# Patient Record
Sex: FEMALE | Race: WHITE | Employment: UNEMPLOYED | URBAN - METROPOLITAN AREA
[De-identification: names, ages, dates, MRNs, and addresses within clinical notes are randomized per-mention and may not be internally consistent; named-entity substitution may affect disease eponyms.]

---

## 2017-07-02 ENCOUNTER — APPOINTMENT (OUTPATIENT)
Dept: GENERAL RADIOLOGY | Age: 27
End: 2017-07-02
Attending: EMERGENCY MEDICINE
Payer: MEDICAID

## 2017-07-02 ENCOUNTER — HOSPITAL ENCOUNTER (EMERGENCY)
Age: 27
Discharge: HOME | End: 2017-07-02
Attending: EMERGENCY MEDICINE
Payer: MEDICAID

## 2017-07-02 VITALS
TEMPERATURE: 99.1 F | BODY MASS INDEX: 24.69 KG/M2 | RESPIRATION RATE: 12 BRPM | HEART RATE: 64 BPM | OXYGEN SATURATION: 100 % | SYSTOLIC BLOOD PRESSURE: 107 MMHG | DIASTOLIC BLOOD PRESSURE: 65 MMHG | WEIGHT: 135 LBS

## 2017-07-02 DIAGNOSIS — S40.012A CONTUSION SHOULDER/ARM, LEFT, INITIAL ENCOUNTER: Primary | ICD-10-CM

## 2017-07-02 DIAGNOSIS — S30.0XXA PELVIC CONTUSION, INITIAL ENCOUNTER: ICD-10-CM

## 2017-07-02 DIAGNOSIS — S40.022A CONTUSION SHOULDER/ARM, LEFT, INITIAL ENCOUNTER: Primary | ICD-10-CM

## 2017-07-02 LAB — HCG UR QL: NEGATIVE

## 2017-07-02 PROCEDURE — 73060 X-RAY EXAM OF HUMERUS: CPT

## 2017-07-02 PROCEDURE — 81025 URINE PREGNANCY TEST: CPT | Performed by: EMERGENCY MEDICINE

## 2017-07-02 PROCEDURE — 99282 EMERGENCY DEPT VISIT SF MDM: CPT

## 2017-07-02 PROCEDURE — 72070 X-RAY EXAM THORAC SPINE 2VWS: CPT

## 2017-07-02 PROCEDURE — 73501 X-RAY EXAM HIP UNI 1 VIEW: CPT

## 2017-07-02 PROCEDURE — 74011250637 HC RX REV CODE- 250/637: Performed by: EMERGENCY MEDICINE

## 2017-07-02 RX ORDER — HYDROCODONE BITARTRATE AND ACETAMINOPHEN 5; 325 MG/1; MG/1
TABLET ORAL
Qty: 15 TAB | Refills: 0 | Status: SHIPPED | OUTPATIENT
Start: 2017-07-02 | End: 2017-08-18

## 2017-07-02 RX ORDER — HYDROCODONE BITARTRATE AND ACETAMINOPHEN 5; 325 MG/1; MG/1
1 TABLET ORAL
Status: COMPLETED | OUTPATIENT
Start: 2017-07-02 | End: 2017-07-02

## 2017-07-02 RX ADMIN — HYDROCODONE BITARTRATE AND ACETAMINOPHEN 1 TABLET: 5; 325 TABLET ORAL at 02:58

## 2017-07-02 NOTE — DISCHARGE INSTRUCTIONS
Low Back Contusion: Care Instructions  Your Care Instructions  Contusion is the medical term for a bruise. When you have a low back bruise, it's often caused by a direct blow or an impact, such as falling against a counter or table. Bruises are common sports injuries. Most people think of a bruise as a black-and-blue spot. This happens when small blood vessels get torn and leak blood under the skin. But bones, muscles, and organs can also get bruised. If these deep tissues are damaged, you may not always see a bruise. The doctor will examine your bruise. You may also have tests to make sure you do not have a more serious injury, such as a broken bone or nerve damage. Tests may include X-rays or other imaging tests like a CT scan or MRI. Low back bruises may cause pain and swelling. But if there is no serious damage, they will often get better with home treatment in several days to a few weeks. The doctor has checked you carefully, but problems can develop later. If you notice any problems or new symptoms, get medical treatment right away. Follow-up care is a key part of your treatment and safety. Be sure to make and go to all appointments, and call your doctor if you are having problems. It's also a good idea to know your test results and keep a list of the medicines you take. How can you care for yourself at home? · Put ice or a cold pack on the sore area for 10 to 20 minutes at a time to stop swelling. Put a thin cloth between the ice pack and your skin. · Be safe with medicines. Read and follow all instructions on the label. ¨ If the doctor gave you a prescription medicine for pain, take it as prescribed. ¨ If you are not taking a prescription pain medicine, ask your doctor if you can take an over-the-counter medicine. · For the first day or two of pain, take it easy. But as soon as possible, get back to your normal daily life and activities. · Get gentle exercise, such as walking.  Movement keeps your spine flexible and helps your muscles stay strong. When should you call for help? Call 911 anytime you think you may need emergency care. For example, call if:  · You are unable to move a leg at all. Call your doctor now or seek immediate medical care if:  · You have new or worse symptoms in your legs or buttocks. Symptoms may include:  ¨ Numbness or tingling. ¨ Weakness. ¨ Pain. · You lose bladder or bowel control. · You have blood in your urine. Watch closely for changes in your health, and be sure to contact your doctor if:  · You do not get better as expected. Where can you learn more? Go to http://aziza-kasie.info/. Enter V337 in the search box to learn more about \"Low Back Contusion: Care Instructions. \"  Current as of: March 20, 2017  Content Version: 11.3  © 5277-2843 [employer]. Care instructions adapted under license by [employer] (which disclaims liability or warranty for this information). If you have questions about a medical condition or this instruction, always ask your healthcare professional. [address] any warranty or liability for your use of this information. Low Back Contusion: Care Instructions  Your Care Instructions  Contusion is the medical term for a bruise. When you have a low back bruise, it's often caused by a direct blow or an impact, such as falling against a counter or table. Bruises are common sports injuries. Most people think of a bruise as a black-and-blue spot. This happens when small blood vessels get torn and leak blood under the skin. But bones, muscles, and organs can also get bruised. If these deep tissues are damaged, you may not always see a bruise. The doctor will examine your bruise. You may also have tests to make sure you do not have a more serious injury, such as a broken bone or nerve damage. Tests may include X-rays or other imaging tests like a CT scan or MRI.   Low back bruises may cause pain and swelling. But if there is no serious damage, they will often get better with home treatment in several days to a few weeks. The doctor has checked you carefully, but problems can develop later. If you notice any problems or new symptoms, get medical treatment right away. Follow-up care is a key part of your treatment and safety. Be sure to make and go to all appointments, and call your doctor if you are having problems. It's also a good idea to know your test results and keep a list of the medicines you take. How can you care for yourself at home? · Put ice or a cold pack on the sore area for 10 to 20 minutes at a time to stop swelling. Put a thin cloth between the ice pack and your skin. · Be safe with medicines. Read and follow all instructions on the label. ¨ If the doctor gave you a prescription medicine for pain, take it as prescribed. ¨ If you are not taking a prescription pain medicine, ask your doctor if you can take an over-the-counter medicine. · For the first day or two of pain, take it easy. But as soon as possible, get back to your normal daily life and activities. · Get gentle exercise, such as walking. Movement keeps your spine flexible and helps your muscles stay strong. When should you call for help? Call 911 anytime you think you may need emergency care. For example, call if:  · You are unable to move a leg at all. Call your doctor now or seek immediate medical care if:  · You have new or worse symptoms in your legs or buttocks. Symptoms may include:  ¨ Numbness or tingling. ¨ Weakness. ¨ Pain. · You lose bladder or bowel control. · You have blood in your urine. Watch closely for changes in your health, and be sure to contact your doctor if:  · You do not get better as expected. Where can you learn more? Go to http://aziza-kasie.info/.   Enter V337 in the search box to learn more about \"Low Back Contusion: Care Instructions. \"  Current as of: March 20, 2017  Content Version: 11.3  © 9397-0352 [employer]. Care instructions adapted under license by [employer] (which disclaims liability or warranty for this information). If you have questions about a medical condition or this instruction, always ask your healthcare professional. [address] any warranty or liability for your use of this information. Low Back Contusion: Care Instructions  Your Care Instructions  Contusion is the medical term for a bruise. When you have a low back bruise, it's often caused by a direct blow or an impact, such as falling against a counter or table. Bruises are common sports injuries. Most people think of a bruise as a black-and-blue spot. This happens when small blood vessels get torn and leak blood under the skin. But bones, muscles, and organs can also get bruised. If these deep tissues are damaged, you may not always see a bruise. The doctor will examine your bruise. You may also have tests to make sure you do not have a more serious injury, such as a broken bone or nerve damage. Tests may include X-rays or other imaging tests like a CT scan or MRI. Low back bruises may cause pain and swelling. But if there is no serious damage, they will often get better with home treatment in several days to a few weeks. The doctor has checked you carefully, but problems can develop later. If you notice any problems or new symptoms, get medical treatment right away. Follow-up care is a key part of your treatment and safety. Be sure to make and go to all appointments, and call your doctor if you are having problems. It's also a good idea to know your test results and keep a list of the medicines you take. How can you care for yourself at home? · Put ice or a cold pack on the sore area for 10 to 20 minutes at a time to stop swelling. Put a thin cloth between the ice pack and your skin.   · Be safe with medicines. Read and follow all instructions on the label. ¨ If the doctor gave you a prescription medicine for pain, take it as prescribed. ¨ If you are not taking a prescription pain medicine, ask your doctor if you can take an over-the-counter medicine. · For the first day or two of pain, take it easy. But as soon as possible, get back to your normal daily life and activities. · Get gentle exercise, such as walking. Movement keeps your spine flexible and helps your muscles stay strong. When should you call for help? Call 911 anytime you think you may need emergency care. For example, call if:  · You are unable to move a leg at all. Call your doctor now or seek immediate medical care if:  · You have new or worse symptoms in your legs or buttocks. Symptoms may include:  ¨ Numbness or tingling. ¨ Weakness. ¨ Pain. · You lose bladder or bowel control. · You have blood in your urine. Watch closely for changes in your health, and be sure to contact your doctor if:  · You do not get better as expected. Where can you learn more? Go to http://aziza-kasie.info/. Enter V337 in the search box to learn more about \"Low Back Contusion: Care Instructions. \"  Current as of: March 20, 2017  Content Version: 11.3  © 6801-2523 [employer], [email or web address]. Care instructions adapted under license by [employer] (which disclaims liability or warranty for this information). If you have questions about a medical condition or this instruction, always ask your healthcare professional. [place] [address] any warranty or liability for your use of this information.

## 2017-07-02 NOTE — ED NOTES
Pt instructed to stay active as tolerated, to follow up with her PCP, to use caution with narcotics, and to return for worsening pain/other concerns.

## 2017-07-02 NOTE — ED PROVIDER NOTES
HPI Comments: 1:08 AM Charissa Caicedo is a 33 y.o. female with hx of scoliosis and hypermobile joints who presents to the ED c/o bilateral hip pain onset today s/p fall. She also c/o L shoulder pain. She states she was carrying a vacuum  while on steep, uneven stairs, she tripped and fell, and the vacuum  pushed into her L shoulder. Pt denies any chance of pregnancy. Pt denies hitting head, LOC, syncope, or any other sx at this time. The history is provided by the patient. No  was used. Past Medical History:   Diagnosis Date    Hypermobile joints     arms, hip, upper back    Scoliosis        Past Surgical History:   Procedure Laterality Date    HX OTHER SURGICAL               Family History:   Problem Relation Age of Onset    Family history unknown: Yes       Social History     Social History    Marital status:      Spouse name: N/A    Number of children: N/A    Years of education: N/A     Occupational History    Not on file. Social History Main Topics    Smoking status: Never Smoker    Smokeless tobacco: Never Used    Alcohol use No    Drug use: No    Sexual activity: Yes     Partners: Male     Birth control/ protection: None     Other Topics Concern    Not on file     Social History Narrative         ALLERGIES: Petrolatum [mineral oil-hydrophil petrolat]; Codeine; and Tramadol    Review of Systems   Constitutional: Negative for chills, fatigue and fever. HENT: Negative for congestion, rhinorrhea and sore throat. Eyes: Negative for visual disturbance. Respiratory: Negative for cough and shortness of breath. Cardiovascular: Negative for chest pain and palpitations. Gastrointestinal: Negative for abdominal pain, diarrhea, nausea and vomiting. Genitourinary: Negative for dysuria, hematuria and urgency. Musculoskeletal: Positive for arthralgias. Negative for myalgias. Skin: Negative for rash and wound.    Neurological: Negative for dizziness and headaches. Psychiatric/Behavioral: The patient is not nervous/anxious. All other systems reviewed and are negative. Vitals:    07/02/17 0053   BP: 117/74   Pulse: 82   Resp: 16   Temp: 99.1 °F (37.3 °C)   SpO2: 100%   Weight: 61.2 kg (135 lb)            Physical Exam   Constitutional: She is oriented to person, place, and time. She appears well-developed and well-nourished. No distress. HENT:   Head: Normocephalic. Right Ear: External ear normal.   Left Ear: External ear normal.   Mouth/Throat: No oropharyngeal exudate. Eyes: Conjunctivae and EOM are normal. Pupils are equal, round, and reactive to light. Right eye exhibits no discharge. Left eye exhibits no discharge. No scleral icterus. Neck: Normal range of motion. Neck supple. No JVD present. No tracheal deviation present. No thyromegaly present. Cardiovascular: Normal rate, regular rhythm, normal heart sounds and intact distal pulses. Exam reveals no gallop and no friction rub. No murmur heard. Pulmonary/Chest: Effort normal and breath sounds normal. No stridor. No respiratory distress. She has no wheezes. She has no rales. She exhibits no tenderness. Abdominal: Soft. Bowel sounds are normal. She exhibits no distension and no mass. There is no tenderness. There is no rebound and no guarding. Musculoskeletal: Normal range of motion. She exhibits no edema or tenderness. LEFT SHOULDER: (+) tenderness over anterior surface, normal ROM, pulses and sensory. No edema or abrasions. Lymphadenopathy:     She has no cervical adenopathy. Neurological: She is alert and oriented to person, place, and time. She displays normal reflexes. No cranial nerve deficit. She exhibits normal muscle tone. Coordination normal.   Skin: Skin is warm and dry. No rash noted. She is not diaphoretic. No erythema. No pallor. Nursing note and vitals reviewed.        MDM  Number of Diagnoses or Management Options  Contusion shoulder/arm, left, initial encounter: new and requires workup  Pelvic contusion, initial encounter: new and requires workup     Amount and/or Complexity of Data Reviewed  Tests in the radiology section of CPT®: ordered and reviewed    Risk of Complications, Morbidity, and/or Mortality  Presenting problems: low  Diagnostic procedures: moderate  Management options: low    Patient Progress  Patient progress: stable    ED Course       Procedures    Vitals:  Patient Vitals for the past 12 hrs:   Temp Pulse Resp BP SpO2   07/02/17 0053 99.1 °F (37.3 °C) 82 16 117/74 100 %     100% on RA, indicating adequate oxygenation. Medications ordered:   Medications - No data to display      X-Ray, CT or other radiology findings or impressions:  XR HIP RT W OR WO PELV  1 VW    (Results Pending)   XR HUMERUS LT    (Results Pending)   XR SPINE THORAC 2 V    (Results Pending)   XR SHOULDER LT AP/LAT MIN 2 V    (Results Pending)   XR PELV 1 OR 2 V    (Results Pending)       Progress notes, Consult notes or additional Procedure notes:       Reevaluation of patient: patient feeling better. Disposition:  Diagnosis:     Disposition:     Follow-up Information     None            Patient's Medications   Start Taking    No medications on file   Continue Taking    NORETHINDRONE-ETHINYL ESTRADIOL (ORTHO-NOVUM 1-35 TAB, NORTREL 1-35 TAB) 1-35 MG-MCG TAB    Take 1 Tab by mouth daily. These Medications have changed    No medications on file   Stop Taking    GABAPENTIN (NEURONTIN) 100 MG CAPSULE    TAKE ONE CAPSULE BY MOUTH THREE TIMES A DAY    MELOXICAM (MOBIC) 7.5 MG TABLET    TAKE 1 TABLET BY MOUTH EVERY DAY    METHOCARBAMOL (ROBAXIN) 500 MG TABLET    Take 0.5-1 Tabs by mouth two (2) times daily as needed. 1 tab PO QHs PRN pain spasms   Indications: MUSCLE SPASM    NAPROXEN (NAPROSYN) 500 MG TABLET    Take 1 Tab by mouth two (2) times daily (with meals).     TRAMADOL (ULTRAM) 50 MG TABLET    Take 1 Tab by mouth every six (6) hours as needed for Pain. Max Daily Amount: 200 mg.          Scribe Attestation:   Baldo Castillo acting as a scribe for and in the presence of Eh Monge MD July 02, 2017 at 12:59 AM     Signed by: Jewell Delacruz, July 02, 2017, 12:59 AM    Provider Attestation:   I personally performed the services described in the documentation, reviewed the documentation, as recorded by the scribe in my presence, and it accurately and completely records my words and actions.      Reviewed and signed by:  Eh Monge MD

## 2017-08-18 ENCOUNTER — HOSPITAL ENCOUNTER (EMERGENCY)
Age: 27
Discharge: HOME | End: 2017-08-18
Attending: EMERGENCY MEDICINE
Payer: MEDICAID

## 2017-08-18 VITALS
OXYGEN SATURATION: 100 % | HEIGHT: 62 IN | WEIGHT: 127 LBS | SYSTOLIC BLOOD PRESSURE: 113 MMHG | TEMPERATURE: 98.7 F | RESPIRATION RATE: 18 BRPM | BODY MASS INDEX: 23.37 KG/M2 | HEART RATE: 83 BPM | DIASTOLIC BLOOD PRESSURE: 76 MMHG
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
## 2017-08-18 PROCEDURE — 74011250637 HC RX REV CODE- 250/637: Performed by: PHYSICIAN ASSISTANT

## 2017-08-18 PROCEDURE — 99283 EMERGENCY DEPT VISIT LOW MDM: CPT

## 2017-08-18 RX ORDER — CEPHALEXIN 500 MG/1
500 CAPSULE ORAL 4 TIMES DAILY
Qty: 28 CAP | Refills: 0 | Status: SHIPPED | OUTPATIENT
Start: 2017-08-18 | End: 2017-08-25

## 2017-08-18 RX ORDER — CEPHALEXIN 250 MG/1
500 CAPSULE ORAL
Status: COMPLETED | OUTPATIENT
Start: 2017-08-18 | End: 2017-08-18

## 2017-08-18 RX ADMIN — CEPHALEXIN 500 MG: 250 CAPSULE ORAL at 20:52

## 2017-08-19 NOTE — ED NOTES
I have reviewed discharge instructions with the patient. The patient verbalized understanding. Patient armband removed and given to patient to take home. Patient was informed of the privacy risks if armband lost or stolen  Current Discharge Medication List      START taking these medications    Details   cephALEXin (KEFLEX) 500 mg capsule Take 1 Cap by mouth four (4) times daily for 7 days.   Qty: 28 Cap, Refills: 0

## 2017-08-19 NOTE — ED PROVIDER NOTES
HPI Comments: 8:34 PM Austin Barreto is a 33 y.o. female who presents to the emergency department with c/o red bump \"spider bite\" onset 2 days ago. No fevers. States that the area is tender and itchy. States redness has improved. No discharge. Rates pain 2/10, sore, constant, non-radiating, worse when touched, no alleviating factors. PCP: Chiquita Garcia MD      The history is provided by the patient. No  was used. Past Medical History:   Diagnosis Date    Hypermobile joints     arms, hip, upper back    Scoliosis        Past Surgical History:   Procedure Laterality Date    HX  SECTION      HX OTHER SURGICAL               Family History:   Problem Relation Age of Onset    Family history unknown: Yes       Social History     Social History    Marital status:      Spouse name: N/A    Number of children: N/A    Years of education: N/A     Occupational History    Not on file. Social History Main Topics    Smoking status: Current Every Day Smoker    Smokeless tobacco: Never Used    Alcohol use No    Drug use: No    Sexual activity: Yes     Partners: Male     Birth control/ protection: None     Other Topics Concern    Not on file     Social History Narrative         ALLERGIES: Petrolatum [mineral oil-hydrophil petrolat]; Codeine; and Tramadol    Review of Systems   Constitutional: Negative for chills and fever. Respiratory: Negative for shortness of breath. Cardiovascular: Negative for chest pain. Gastrointestinal: Negative for abdominal pain, nausea and vomiting. Musculoskeletal: Negative for arthralgias. Skin: Positive for color change. Hematological: Does not bruise/bleed easily.        Vitals:    17 2037 17 2041   BP: 113/76    Pulse: 83    Resp: 18    Temp: 98.7 °F (37.1 °C)    SpO2: 100% 100%   Weight: 57.6 kg (127 lb)    Height: 5' 2\" (1.575 m)             Physical Exam   Constitutional: She is oriented to person, place, and time. She appears well-developed and well-nourished. No distress. HENT:   Head: Normocephalic and atraumatic. Pulmonary/Chest: Effort normal. No respiratory distress. Musculoskeletal: Normal range of motion. Legs:  Neurological: She is alert and oriented to person, place, and time. Skin: Skin is warm and dry. Nursing note and vitals reviewed. MDM  Number of Diagnoses or Management Options  Infected insect bite, initial encounter:   Diagnosis management comments: Differential Diagnosis:  Abscess, cellulitis, erysipelas, folliculitis,allergic reaction, insect bite    No palpable abscess, likely infected insect bite. Will treat with keflex. Discussed reasons to return to the ED. ED Course       Procedures      Vitals:  Patient Vitals for the past 12 hrs:   Temp Pulse Resp BP SpO2   08/18/17 2041 - - - - 100 %   08/18/17 2037 98.7 °F (37.1 °C) 83 18 113/76 100 %       Medications ordered:   Medications   cephALEXin (KEFLEX) capsule 500 mg (not administered)         Lab findings:  No results found for this or any previous visit (from the past 12 hour(s)). Disposition:  Diagnosis:   1. Infected insect bite, initial encounter        Disposition: d/c home    Follow-up Information     Follow up With Details Comments Contact Info    Delores Nowak MD  As needed [address] [place], [place] [address]e S  [phone]      [facility] EMERGENCY DEPT  As needed, If symptoms worsen [address] [facility]  [phone]            Patient's Medications   Start Taking    CEPHALEXIN (KEFLEX) 500 MG CAPSULE    Take 1 Cap by mouth four (4) times daily for 7 days. Continue Taking    NORETHINDRONE-ETHINYL ESTRADIOL (ORTHO-NOVUM 1-35 TAB, NORTREL 1-35 TAB) 1-35 MG-MCG TAB    Take 1 Tab by mouth daily.    These Medications have changed    No medications on file   Stop Taking    HYDROCODONE-ACETAMINOPHEN (NORCO) 5-325 MG PER TABLET    Take 1-2 tablets PO every 4-6 hours as needed for pain control. If over the counter ibuprofen or acetaminophen was suggested, then only take the vicodin for pain not well controlled with the over the counter medication.

## 2018-04-07 ENCOUNTER — HOSPITAL ENCOUNTER (EMERGENCY)
Age: 28
Discharge: HOME | End: 2018-04-08
Attending: EMERGENCY MEDICINE
Payer: MEDICAID

## 2018-04-07 ENCOUNTER — APPOINTMENT (OUTPATIENT)
Dept: GENERAL RADIOLOGY | Age: 28
End: 2018-04-07
Attending: EMERGENCY MEDICINE
Payer: MEDICAID

## 2018-04-07 ENCOUNTER — APPOINTMENT (OUTPATIENT)
Dept: CT IMAGING | Age: 28
End: 2018-04-07
Attending: PHYSICIAN ASSISTANT
Payer: MEDICAID

## 2018-04-07 DIAGNOSIS — K52.9 ENTERITIS: Primary | ICD-10-CM

## 2018-04-07 LAB
ALBUMIN SERPL-MCNC: 3.9 G/DL (ref 3.4–5)
ALBUMIN/GLOB SERPL: 0.9 {RATIO} (ref 0.8–1.7)
ALP SERPL-CCNC: 67 U/L (ref 45–117)
ALT SERPL-CCNC: 17 U/L (ref 13–56)
ANION GAP SERPL CALC-SCNC: 14 MMOL/L (ref 3–18)
APPEARANCE UR: ABNORMAL
AST SERPL-CCNC: 15 U/L (ref 15–37)
BASOPHILS # BLD: 0 K/UL (ref 0–0.06)
BASOPHILS NFR BLD: 0 % (ref 0–3)
BILIRUB SERPL-MCNC: 0.4 MG/DL (ref 0.2–1)
BILIRUB UR QL: NEGATIVE
BUN SERPL-MCNC: 11 MG/DL (ref 7–18)
BUN/CREAT SERPL: 15 (ref 12–20)
CALCIUM SERPL-MCNC: 9.6 MG/DL (ref 8.5–10.1)
CHLORIDE SERPL-SCNC: 106 MMOL/L (ref 100–108)
CO2 SERPL-SCNC: 21 MMOL/L (ref 21–32)
COLOR UR: YELLOW
CREAT SERPL-MCNC: 0.75 MG/DL (ref 0.6–1.3)
DIFFERENTIAL METHOD BLD: ABNORMAL
EOSINOPHIL # BLD: 0 K/UL (ref 0–0.4)
EOSINOPHIL NFR BLD: 0 % (ref 0–5)
ERYTHROCYTE [DISTWIDTH] IN BLOOD BY AUTOMATED COUNT: 12.7 % (ref 11.6–14.5)
GLOBULIN SER CALC-MCNC: 4.5 G/DL (ref 2–4)
GLUCOSE SERPL-MCNC: 119 MG/DL (ref 74–99)
GLUCOSE UR STRIP.AUTO-MCNC: NEGATIVE MG/DL
HCG UR QL: NEGATIVE
HCT VFR BLD AUTO: 44.4 % (ref 35–45)
HGB BLD-MCNC: 15.2 G/DL (ref 12–16)
HGB UR QL STRIP: NEGATIVE
KETONES UR QL STRIP.AUTO: 80 MG/DL
LACTATE BLD-SCNC: 3.2 MMOL/L (ref 0.4–2)
LEUKOCYTE ESTERASE UR QL STRIP.AUTO: NEGATIVE
LYMPHOCYTES # BLD: 1.3 K/UL (ref 0.8–3.5)
LYMPHOCYTES NFR BLD: 6 % (ref 20–51)
MCH RBC QN AUTO: 30.4 PG (ref 24–34)
MCHC RBC AUTO-ENTMCNC: 34.2 G/DL (ref 31–37)
MCV RBC AUTO: 88.8 FL (ref 74–97)
MONOCYTES # BLD: 0.7 K/UL (ref 0–1)
MONOCYTES NFR BLD: 3 % (ref 2–9)
NEUTS BAND NFR BLD MANUAL: 3 % (ref 0–5)
NEUTS SEG # BLD: 19.9 K/UL (ref 1.8–8)
NEUTS SEG NFR BLD: 88 % (ref 42–75)
NITRITE UR QL STRIP.AUTO: NEGATIVE
PH UR STRIP: 8 [PH] (ref 5–8)
PLATELET # BLD AUTO: 334 K/UL (ref 135–420)
PLATELET COMMENTS,PCOM: ABNORMAL
PMV BLD AUTO: 9.8 FL (ref 9.2–11.8)
POTASSIUM SERPL-SCNC: 3.7 MMOL/L (ref 3.5–5.5)
PROT SERPL-MCNC: 8.4 G/DL (ref 6.4–8.2)
PROT UR STRIP-MCNC: NEGATIVE MG/DL
RBC # BLD AUTO: 5 M/UL (ref 4.2–5.3)
RBC MORPH BLD: ABNORMAL
SODIUM SERPL-SCNC: 141 MMOL/L (ref 136–145)
SP GR UR REFRACTOMETRY: 1.02 (ref 1–1.03)
UROBILINOGEN UR QL STRIP.AUTO: 0.2 EU/DL (ref 0.2–1)
WBC # BLD AUTO: 21.9 K/UL (ref 4.6–13.2)

## 2018-04-07 PROCEDURE — 96361 HYDRATE IV INFUSION ADD-ON: CPT

## 2018-04-07 PROCEDURE — 71045 X-RAY EXAM CHEST 1 VIEW: CPT

## 2018-04-07 PROCEDURE — 80053 COMPREHEN METABOLIC PANEL: CPT | Performed by: EMERGENCY MEDICINE

## 2018-04-07 PROCEDURE — 74011636320 HC RX REV CODE- 636/320: Performed by: EMERGENCY MEDICINE

## 2018-04-07 PROCEDURE — 96374 THER/PROPH/DIAG INJ IV PUSH: CPT

## 2018-04-07 PROCEDURE — 74011250636 HC RX REV CODE- 250/636: Performed by: EMERGENCY MEDICINE

## 2018-04-07 PROCEDURE — 87040 BLOOD CULTURE FOR BACTERIA: CPT | Performed by: EMERGENCY MEDICINE

## 2018-04-07 PROCEDURE — 96376 TX/PRO/DX INJ SAME DRUG ADON: CPT

## 2018-04-07 PROCEDURE — 99284 EMERGENCY DEPT VISIT MOD MDM: CPT

## 2018-04-07 PROCEDURE — 85025 COMPLETE CBC W/AUTO DIFF WBC: CPT | Performed by: EMERGENCY MEDICINE

## 2018-04-07 PROCEDURE — 74177 CT ABD & PELVIS W/CONTRAST: CPT

## 2018-04-07 PROCEDURE — 74011000250 HC RX REV CODE- 250: Performed by: EMERGENCY MEDICINE

## 2018-04-07 PROCEDURE — 96375 TX/PRO/DX INJ NEW DRUG ADDON: CPT

## 2018-04-07 PROCEDURE — 83605 ASSAY OF LACTIC ACID: CPT

## 2018-04-07 PROCEDURE — 81003 URINALYSIS AUTO W/O SCOPE: CPT | Performed by: EMERGENCY MEDICINE

## 2018-04-07 PROCEDURE — 74011250636 HC RX REV CODE- 250/636: Performed by: PHYSICIAN ASSISTANT

## 2018-04-07 PROCEDURE — 81025 URINE PREGNANCY TEST: CPT | Performed by: EMERGENCY MEDICINE

## 2018-04-07 RX ORDER — ONDANSETRON 2 MG/ML
4 INJECTION INTRAMUSCULAR; INTRAVENOUS
Status: COMPLETED | OUTPATIENT
Start: 2018-04-07 | End: 2018-04-07

## 2018-04-07 RX ORDER — MORPHINE SULFATE 4 MG/ML
4 INJECTION INTRAVENOUS
Status: COMPLETED | OUTPATIENT
Start: 2018-04-07 | End: 2018-04-07

## 2018-04-07 RX ADMIN — ONDANSETRON 4 MG: 2 INJECTION, SOLUTION INTRAMUSCULAR; INTRAVENOUS at 22:46

## 2018-04-07 RX ADMIN — MORPHINE SULFATE 4 MG: 4 INJECTION INTRAVENOUS at 22:46

## 2018-04-07 RX ADMIN — IOPAMIDOL 75 ML: 612 INJECTION, SOLUTION INTRAVENOUS at 23:04

## 2018-04-07 RX ADMIN — Medication 1 G: at 22:46

## 2018-04-07 RX ADMIN — SODIUM CHLORIDE 1000 ML: 900 INJECTION, SOLUTION INTRAVENOUS at 22:22

## 2018-04-07 RX ADMIN — SODIUM CHLORIDE 1000 ML: 900 INJECTION, SOLUTION INTRAVENOUS at 20:45

## 2018-04-07 RX ADMIN — ONDANSETRON 4 MG: 2 INJECTION, SOLUTION INTRAMUSCULAR; INTRAVENOUS at 21:00

## 2018-04-08 VITALS
SYSTOLIC BLOOD PRESSURE: 123 MMHG | DIASTOLIC BLOOD PRESSURE: 68 MMHG | RESPIRATION RATE: 14 BRPM | OXYGEN SATURATION: 100 % | HEART RATE: 89 BPM | TEMPERATURE: 98.2 F

## 2018-04-08 RX ORDER — ONDANSETRON 4 MG/1
4 TABLET, FILM COATED ORAL
Qty: 12 TAB | Refills: 0 | Status: SHIPPED | OUTPATIENT
Start: 2018-04-08 | End: 2018-04-11

## 2018-04-08 RX ORDER — CIPROFLOXACIN 500 MG/1
500 TABLET ORAL 2 TIMES DAILY
Qty: 14 TAB | Refills: 0 | Status: SHIPPED | OUTPATIENT
Start: 2018-04-08 | End: 2018-04-15

## 2018-04-08 NOTE — DISCHARGE INSTRUCTIONS
Colitis: Care Instructions  Your Care Instructions  Colitis is the medical term for swelling (inflammation) of the intestine. It can be caused by different things, such as an infection or loss of blood flow in the intestine. Other causes are problems like Crohn's disease or ulcerative colitis. Symptoms may include fever, diarrhea that may be bloody, or belly pain. Sometimes symptoms go away without treatment. But you may need treatment or more tests, such as blood tests or a stool test. Or you may need imaging tests like a CT scan or a colonoscopy. In some cases, the doctor may want to test a sample of tissue from the intestine. This test is called a biopsy. The doctor has checked you carefully, but problems can develop later. If you notice any problems or new symptoms, get medical treatment right away. Follow-up care is a key part of your treatment and safety. Be sure to make and go to all appointments, and call your doctor if you are having problems. It's also a good idea to know your test results and keep a list of the medicines you take. How can you care for yourself at home? · Rest until you feel better. · Your doctor may recommend that you eat bland foods. These include rice, dry toast or crackers, bananas, and applesauce. · To prevent dehydration, drink plenty of fluids. Choose water and other caffeine-free clear liquids until you feel better. If you have kidney, heart, or liver disease and have to limit fluids, talk with your doctor before you increase the amount of fluids you drink. · Be safe with medicines. Take your medicines exactly as prescribed. Call your doctor if you think you are having a problem with your medicine. You will get more details on the specific medicines your doctor prescribes. When should you call for help? Call 911 anytime you think you may need emergency care. For example, call if:  ? · You passed out (lost consciousness). ? · Your stools are maroon or very bloody. ?Call your doctor now or seek immediate medical care if:  ? · You have new or worse belly pain. ? · You have a fever. ? · You are vomiting. ? · You cannot pass stools or gas. ? · You have new or more blood in your stools. ? Watch closely for changes in your health, and be sure to contact your doctor if:  ? · You have new or worse symptoms. ? · You are losing weight. ? · You do not get better as expected. Where can you learn more? Go to http://aziza-kasie.info/. Teddi Sever in the search box to learn more about \"Colitis: Care Instructions. \"  Current as of: May 12, 2017  Content Version: 11.4  © 3499-7596 [employer], [employer]. Care instructions adapted under license by [employer] (which disclaims liability or warranty for this information). If you have questions about a medical condition or this instruction, always ask your healthcare professional. [place] [address] any warranty or liability for your use of this information.

## 2018-04-08 NOTE — ED PROVIDER NOTES
EMERGENCY DEPARTMENT HISTORY AND PHYSICAL EXAM    11:17 PM      Date: 2018  Patient Name: Andreas Chambers    History of Presenting Illness     Chief Complaint   Patient presents with    Abdominal Pain     History Provided By: Patient    Chief Complaint: Abdominal pain  Duration: ~7 Hours  Timing:  Acute  Location: Right sided abdomen  Quality: N/A  Severity: N/A  Modifying Factors: No relieving or worsening factors   Associated Symptoms: Nausea and vomiting      Additional History (Context): Andreas Chambers is a 33 y.o. female with PMHX of scoliosis who presents with acute right sided abdominal pain, onset ~7 hours ago. Associated sxs include nausea and vomiting. Pt states she has not eaten anything yet today. Pt's mother and daughter are recently getting over a stomach virus. Denies diarrhea, fever, dysuria, and hx of abdominal surgeries. No modifying or aggravating factors were reported. No other symptoms or concerns were expressed. PCP: Freddy Morris MD    Current Outpatient Prescriptions   Medication Sig Dispense Refill    ciprofloxacin HCl (CIPRO) 500 mg tablet Take 1 Tab by mouth two (2) times a day for 7 days. 14 Tab 0    ondansetron hcl (ZOFRAN, AS HYDROCHLORIDE,) 4 mg tablet Take 1 Tab by mouth every eight (8) hours as needed for Nausea. 12 Tab 0    norethindrone-ethinyl estradiol (ORTHO-NOVUM 1-35 TAB, NORTREL 1-35 TAB) 1-35 mg-mcg tab Take 1 Tab by mouth daily.          Past History     Past Medical History:  Past Medical History:   Diagnosis Date    Hypermobile joints     arms, hip, upper back    Scoliosis        Past Surgical History:  Past Surgical History:   Procedure Laterality Date    HX  SECTION      HX OTHER SURGICAL             Family History:  Family History   Problem Relation Age of Onset    Family history unknown: Yes       Social History:  Social History   Substance Use Topics    Smoking status: Current Every Day Smoker    Smokeless tobacco: Never Used  Alcohol use No       Allergies: Allergies   Allergen Reactions    Petrolatum [Mineral Oil-Hydrophil Petrolat] Anaphylaxis and Rash    Codeine Nausea and Vomiting    Tramadol Nausea and Vomiting         Review of Systems     Review of Systems   Constitutional: Negative for chills and fever. Respiratory: Negative for shortness of breath. Cardiovascular: Negative for chest pain. Gastrointestinal: Positive for abdominal pain, nausea and vomiting. Negative for diarrhea. Genitourinary: Negative for dysuria. All other systems reviewed and are negative. Physical Exam     Visit Vitals    /68    Pulse 89    Temp 98.2 °F (36.8 °C)    Resp 14    SpO2 100%       Physical Exam   Constitutional: She is oriented to person, place, and time. She appears well-developed and well-nourished. She appears distressed (mild). HENT:   Head: Normocephalic and atraumatic. Eyes: Conjunctivae and EOM are normal. Right eye exhibits no discharge. Left eye exhibits no discharge. No scleral icterus. Neck: Normal range of motion. Neck supple. No tracheal deviation present. Cardiovascular: Normal rate, regular rhythm and normal heart sounds. No murmur heard. Pulmonary/Chest: Effort normal and breath sounds normal. No respiratory distress. She has no wheezes. She has no rales. Abdominal: Soft. She exhibits no distension. There is tenderness (severe) in the right lower quadrant. There is no rebound and no guarding.   + Rosving sign. Musculoskeletal: Normal range of motion. She exhibits no edema or deformity. Neurological: She is alert and oriented to person, place, and time. No cranial nerve deficit. Skin: Skin is warm and dry. She is not diaphoretic. Psychiatric: She has a normal mood and affect. Her behavior is normal. Judgment and thought content normal.   Nursing note and vitals reviewed.         Diagnostic Study Results     Labs -  Recent Results (from the past 12 hour(s))   CBC WITH AUTOMATED DIFF    Collection Time: 04/07/18  9:00 PM   Result Value Ref Range    WBC 21.9 (H) 4.6 - 13.2 K/uL    RBC 5.00 4.20 - 5.30 M/uL    HGB 15.2 12.0 - 16.0 g/dL    HCT 44.4 35.0 - 45.0 %    MCV 88.8 74.0 - 97.0 FL    MCH 30.4 24.0 - 34.0 PG    MCHC 34.2 31.0 - 37.0 g/dL    RDW 12.7 11.6 - 14.5 %    PLATELET 094 498 - 044 K/uL    MPV 9.8 9.2 - 11.8 FL    NEUTROPHILS 88 (H) 42 - 75 %    BAND NEUTROPHILS 3 0 - 5 %    LYMPHOCYTES 6 (L) 20 - 51 %    MONOCYTES 3 2 - 9 %    EOSINOPHILS 0 0 - 5 %    BASOPHILS 0 0 - 3 %    ABS. NEUTROPHILS 19.9 (H) 1.8 - 8.0 K/UL    ABS. LYMPHOCYTES 1.3 0.8 - 3.5 K/UL    ABS. MONOCYTES 0.7 0 - 1.0 K/UL    ABS. EOSINOPHILS 0.0 0.0 - 0.4 K/UL    ABS. BASOPHILS 0.0 0.0 - 0.06 K/UL    DF MANUAL      PLATELET COMMENTS ADEQUATE PLATELETS      RBC COMMENTS NORMOCYTIC, NORMOCHROMIC     METABOLIC PANEL, COMPREHENSIVE    Collection Time: 04/07/18  9:00 PM   Result Value Ref Range    Sodium 141 136 - 145 mmol/L    Potassium 3.7 3.5 - 5.5 mmol/L    Chloride 106 100 - 108 mmol/L    CO2 21 21 - 32 mmol/L    Anion gap 14 3.0 - 18 mmol/L    Glucose 119 (H) 74 - 99 mg/dL    BUN 11 7.0 - 18 MG/DL    Creatinine 0.75 0.6 - 1.3 MG/DL    BUN/Creatinine ratio 15 12 - 20      GFR est AA >60 >60 ml/min/1.73m2    GFR est non-AA >60 >60 ml/min/1.73m2    Calcium 9.6 8.5 - 10.1 MG/DL    Bilirubin, total 0.4 0.2 - 1.0 MG/DL    ALT (SGPT) 17 13 - 56 U/L    AST (SGOT) 15 15 - 37 U/L    Alk.  phosphatase 67 45 - 117 U/L    Protein, total 8.4 (H) 6.4 - 8.2 g/dL    Albumin 3.9 3.4 - 5.0 g/dL    Globulin 4.5 (H) 2.0 - 4.0 g/dL    A-G Ratio 0.9 0.8 - 1.7     POC LACTIC ACID    Collection Time: 04/07/18  9:12 PM   Result Value Ref Range    Lactic Acid (POC) 2.8 (HH) 0.4 - 2.0 mmol/L   POC LACTIC ACID    Collection Time: 04/07/18  9:28 PM   Result Value Ref Range    Lactic Acid (POC) 3.2 (HH) 0.4 - 2.0 mmol/L   URINALYSIS W/ RFLX MICROSCOPIC    Collection Time: 04/07/18 10:54 PM   Result Value Ref Range    Color YELLOW Appearance CLOUDY      Specific gravity 1.019 1.005 - 1.030      pH (UA) 8.0 5.0 - 8.0      Protein NEGATIVE  NEG mg/dL    Glucose NEGATIVE  NEG mg/dL    Ketone 80 (A) NEG mg/dL    Bilirubin NEGATIVE  NEG      Blood NEGATIVE  NEG      Urobilinogen 0.2 0.2 - 1.0 EU/dL    Nitrites NEGATIVE  NEG      Leukocyte Esterase NEGATIVE  NEG     HCG URINE, QL    Collection Time: 04/07/18 10:54 PM   Result Value Ref Range    HCG urine, QL NEGATIVE  NEG         Radiologic Studies -   CT ABD PELV W CONT   Final Result   IMPRESSION:  1. Mildly prominent small bowel loops may reflect enteritis. As read by the radiologist.    XR CHEST PORT    (Results Pending)     1:47 AM  RADIOLOGY FINDINGS  Chest X-ray shows NACPP. Pending review by Radiologist  Recorded by Robinson Beck. Doris Wilsno, ED Scribe, as dictated by Lesly Rosario MD.       Medical Decision Making   I am the first provider for this patient. I reviewed the vital signs, available nursing notes, past medical history, past surgical history, family history and social history. Vital Signs-Reviewed the patient's vital signs. Pulse Oximetry Analysis -  99% on room air     Records Reviewed: Nursing Notes (Time of Review: 11:17 PM)    ED Course: Progress Notes, Reevaluation, and Consults:  10:22 PM sepsis alert called. 12:24 AM Pt updated on results. Feeling and looking much better. Pt and family agree with DC plan. Provider Notes (Medical Decision Making): Pt with gastroenteritis. No appendicitis on CT. Improved after fluid resuscitation. Will DC with antibiotics. Core Measures:     12:34 AM - I suspect that this patient has an active infection. 12:34 AM - The patient met criteria for severe sepsis at this time.       PROVIDER SEPSIS PHYSICAL EXAM EVAL  Vital signs reviewed (see nursing documentation for further details):  Vitals:    04/07/18 2040 04/07/18 2245 04/07/18 2300   BP: 108/74 116/74 123/68   Pulse: (!) 109 94 89   Resp: 20  14   Temp: 98.2 °F (36.8 °C)     SpO2: 99% 99% 100%       Cardiac exam:Regular Rate    Pulmonary exam:Normal    Peripheral pulses:Normal    Capillary refill:Normal    Skin exam:pink    Exam performed by Zaynab Enriquez MD    Diagnosis     Clinical Impression:   1. Enteritis        Disposition: discharge    Follow-up Information     Follow up With Details Comments Contact Info    Aric Carpio MD Schedule an appointment as soon as possible for a visit  [address] [facility]      [facility] EMERGENCY DEPT  If symptoms worsen [address]  [phone]           Discharge Medication List as of 4/8/2018 12:37 AM      START taking these medications    Details   ciprofloxacin HCl (CIPRO) 500 mg tablet Take 1 Tab by mouth two (2) times a day for 7 days. , Print, Disp-14 Tab, R-0         CONTINUE these medications which have NOT CHANGED    Details   norethindrone-ethinyl estradiol (ORTHO-NOVUM 1-35 TAB, NORTREL 1-35 TAB) 1-35 mg-mcg tab Take 1 Tab by mouth daily. , Historical Med           _______________________________    Attestations:  Scribe [address] [place] [address] acting as a scribe for and in the presence of Zaynab Enriquez MD      April 08, 2018 at 5:54 AM       Provider Attestation:      I personally performed the services described in the documentation, reviewed the documentation, as recorded by the scribe in my presence, and it accurately and completely records my words and actions.  April 08, 2018 at 5:54 AM - Zaynab Enriquez MD    _______________________________

## 2018-04-08 NOTE — ED TRIAGE NOTES
Patient arrived to the ED complaining of lower right abdominal pain that started this evening. Patient's mother reports that she and her granddaughter are both getting over a stomach bug.  Patient does have her appendix

## 2018-04-09 LAB — LACTATE BLD-SCNC: 2.8 MMOL/L (ref 0.4–2)

## 2018-04-11 ENCOUNTER — HOSPITAL ENCOUNTER (EMERGENCY)
Age: 28
Discharge: HOME | End: 2018-04-11
Attending: EMERGENCY MEDICINE
Payer: MEDICAID

## 2018-04-11 ENCOUNTER — APPOINTMENT (OUTPATIENT)
Dept: GENERAL RADIOLOGY | Age: 28
End: 2018-04-11
Attending: EMERGENCY MEDICINE
Payer: MEDICAID

## 2018-04-11 VITALS
DIASTOLIC BLOOD PRESSURE: 66 MMHG | WEIGHT: 127 LBS | HEART RATE: 90 BPM | BODY MASS INDEX: 23.37 KG/M2 | RESPIRATION RATE: 18 BRPM | OXYGEN SATURATION: 97 % | TEMPERATURE: 99.2 F | SYSTOLIC BLOOD PRESSURE: 100 MMHG | HEIGHT: 62 IN

## 2018-04-11 DIAGNOSIS — R11.2 NON-INTRACTABLE VOMITING WITH NAUSEA, UNSPECIFIED VOMITING TYPE: ICD-10-CM

## 2018-04-11 DIAGNOSIS — R10.84 ABDOMINAL PAIN, GENERALIZED: Primary | ICD-10-CM

## 2018-04-11 DIAGNOSIS — R19.7 DIARRHEA, UNSPECIFIED TYPE: ICD-10-CM

## 2018-04-11 DIAGNOSIS — K52.9 GASTROENTERITIS: ICD-10-CM

## 2018-04-11 LAB
ALBUMIN SERPL-MCNC: 3.2 G/DL (ref 3.4–5)
ALBUMIN/GLOB SERPL: 0.9 {RATIO} (ref 0.8–1.7)
ALP SERPL-CCNC: 49 U/L (ref 45–117)
ALT SERPL-CCNC: 30 U/L (ref 13–56)
ANION GAP SERPL CALC-SCNC: 8 MMOL/L (ref 3–18)
APPEARANCE UR: ABNORMAL
AST SERPL-CCNC: 29 U/L (ref 15–37)
BACTERIA URNS QL MICRO: ABNORMAL /HPF
BASOPHILS # BLD: 0 K/UL (ref 0–0.1)
BASOPHILS NFR BLD: 0 % (ref 0–2)
BILIRUB DIRECT SERPL-MCNC: <0.1 MG/DL (ref 0–0.2)
BILIRUB SERPL-MCNC: 0.2 MG/DL (ref 0.2–1)
BILIRUB UR QL: ABNORMAL
BUN SERPL-MCNC: 7 MG/DL (ref 7–18)
BUN/CREAT SERPL: 10 (ref 12–20)
CALCIUM SERPL-MCNC: 8.6 MG/DL (ref 8.5–10.1)
CAOX CRY URNS QL MICRO: ABNORMAL
CHLORIDE SERPL-SCNC: 107 MMOL/L (ref 100–108)
CO2 SERPL-SCNC: 27 MMOL/L (ref 21–32)
COLOR UR: ABNORMAL
CREAT SERPL-MCNC: 0.69 MG/DL (ref 0.6–1.3)
DIFFERENTIAL METHOD BLD: ABNORMAL
EOSINOPHIL # BLD: 0.1 K/UL (ref 0–0.4)
EOSINOPHIL NFR BLD: 1 % (ref 0–5)
EPITH CASTS URNS QL MICRO: ABNORMAL /LPF (ref 0–5)
ERYTHROCYTE [DISTWIDTH] IN BLOOD BY AUTOMATED COUNT: 12.9 % (ref 11.6–14.5)
GLOBULIN SER CALC-MCNC: 3.7 G/DL (ref 2–4)
GLUCOSE SERPL-MCNC: 101 MG/DL (ref 74–99)
GLUCOSE UR STRIP.AUTO-MCNC: NEGATIVE MG/DL
HCG UR QL: NEGATIVE
HCT VFR BLD AUTO: 37.6 % (ref 35–45)
HGB BLD-MCNC: 12.5 G/DL (ref 12–16)
HGB UR QL STRIP: ABNORMAL
KETONES UR QL STRIP.AUTO: ABNORMAL MG/DL
LACTATE BLD-SCNC: 1.4 MMOL/L (ref 0.4–2)
LEUKOCYTE ESTERASE UR QL STRIP.AUTO: ABNORMAL
LIPASE SERPL-CCNC: 177 U/L (ref 73–393)
LYMPHOCYTES # BLD: 1.2 K/UL (ref 0.9–3.6)
LYMPHOCYTES NFR BLD: 8 % (ref 21–52)
MCH RBC QN AUTO: 29.1 PG (ref 24–34)
MCHC RBC AUTO-ENTMCNC: 33.2 G/DL (ref 31–37)
MCV RBC AUTO: 87.6 FL (ref 74–97)
MONOCYTES # BLD: 0.4 K/UL (ref 0.05–1.2)
MONOCYTES NFR BLD: 3 % (ref 3–10)
MUCOUS THREADS URNS QL MICRO: ABNORMAL /LPF
NEUTS SEG # BLD: 13.6 K/UL (ref 1.8–8)
NEUTS SEG NFR BLD: 88 % (ref 40–73)
NITRITE UR QL STRIP.AUTO: NEGATIVE
PH UR STRIP: 5.5 [PH] (ref 5–8)
PLATELET # BLD AUTO: 213 K/UL (ref 135–420)
PMV BLD AUTO: 8.7 FL (ref 9.2–11.8)
POTASSIUM SERPL-SCNC: 3.2 MMOL/L (ref 3.5–5.5)
PROT SERPL-MCNC: 6.9 G/DL (ref 6.4–8.2)
PROT UR STRIP-MCNC: ABNORMAL MG/DL
RBC # BLD AUTO: 4.29 M/UL (ref 4.2–5.3)
RBC #/AREA URNS HPF: ABNORMAL /HPF (ref 0–5)
SODIUM SERPL-SCNC: 142 MMOL/L (ref 136–145)
SP GR UR REFRACTOMETRY: 1.02 (ref 1–1.03)
UROBILINOGEN UR QL STRIP.AUTO: 1 EU/DL (ref 0.2–1)
WBC # BLD AUTO: 15.4 K/UL (ref 4.6–13.2)
WBC URNS QL MICRO: ABNORMAL /HPF (ref 0–4)

## 2018-04-11 PROCEDURE — 83605 ASSAY OF LACTIC ACID: CPT

## 2018-04-11 PROCEDURE — 74011250636 HC RX REV CODE- 250/636: Performed by: EMERGENCY MEDICINE

## 2018-04-11 PROCEDURE — 85025 COMPLETE CBC W/AUTO DIFF WBC: CPT | Performed by: EMERGENCY MEDICINE

## 2018-04-11 PROCEDURE — 81001 URINALYSIS AUTO W/SCOPE: CPT | Performed by: EMERGENCY MEDICINE

## 2018-04-11 PROCEDURE — 96374 THER/PROPH/DIAG INJ IV PUSH: CPT

## 2018-04-11 PROCEDURE — 80048 BASIC METABOLIC PNL TOTAL CA: CPT | Performed by: EMERGENCY MEDICINE

## 2018-04-11 PROCEDURE — 83690 ASSAY OF LIPASE: CPT | Performed by: EMERGENCY MEDICINE

## 2018-04-11 PROCEDURE — 99283 EMERGENCY DEPT VISIT LOW MDM: CPT

## 2018-04-11 PROCEDURE — 80076 HEPATIC FUNCTION PANEL: CPT | Performed by: EMERGENCY MEDICINE

## 2018-04-11 PROCEDURE — 81025 URINE PREGNANCY TEST: CPT | Performed by: EMERGENCY MEDICINE

## 2018-04-11 PROCEDURE — 74022 RADEX COMPL AQT ABD SERIES: CPT

## 2018-04-11 PROCEDURE — 96361 HYDRATE IV INFUSION ADD-ON: CPT

## 2018-04-11 RX ORDER — ONDANSETRON 4 MG/1
8 TABLET, FILM COATED ORAL
Qty: 12 TAB | Refills: 0 | Status: SHIPPED | OUTPATIENT
Start: 2018-04-11 | End: 2019-08-12

## 2018-04-11 RX ORDER — ONDANSETRON 2 MG/ML
4 INJECTION INTRAMUSCULAR; INTRAVENOUS
Status: COMPLETED | OUTPATIENT
Start: 2018-04-11 | End: 2018-04-11

## 2018-04-11 RX ADMIN — ONDANSETRON 4 MG: 2 INJECTION, SOLUTION INTRAMUSCULAR; INTRAVENOUS at 11:48

## 2018-04-11 RX ADMIN — SODIUM CHLORIDE 1000 ML: 900 INJECTION, SOLUTION INTRAVENOUS at 11:48

## 2018-04-11 NOTE — ED PROVIDER NOTES
[facility] EMERGENCY [facility]PT      11:16 AM    Date: 2018  Patient Name: Nilsa Smalls    History of Presenting Illness     Chief Complaint   Patient presents with    Vomiting    Diarrhea       History Provided By: Patient    Chief Complaint: diarrhea  Duration:  Days  Timing:  Acute  Location: n/a  Quality: n/a  Severity: Moderate  Modifying Factors: denies any modifying factors. Associated Symptoms: vomiting, nausea, abd pain    33 y.o. female with noted past medical history of scoliosis, hypermobile joints, who presents to the emergency department with \"intense\" diarrhea onset yesterday. The pt reports she was here 4 days ago and Dx with a bacterial infection of her intestines. Associated Sx include vomiting, nausea, and abd pain. Denies any modifying factors. Reports she was Rx abx, and Zofran which she has been taking as Rx. The pt reports that she has been on her menstrual period for the past 3 weeks. No further complaints at this time. Nursing notes regarding the HPI and triage nursing notes were reviewed. Prior medical records were reviewed. Current Outpatient Prescriptions   Medication Sig Dispense Refill    ciprofloxacin HCl (CIPRO) 500 mg tablet Take 1 Tab by mouth two (2) times a day for 7 days. 14 Tab 0    ondansetron hcl (ZOFRAN, AS HYDROCHLORIDE,) 4 mg tablet Take 1 Tab by mouth every eight (8) hours as needed for Nausea. 12 Tab 0    norethindrone-ethinyl estradiol (ORTHO-NOVUM 1-35 TAB, NORTREL 1-35 TAB) 1-35 mg-mcg tab Take 1 Tab by mouth daily.          Past History     Past Medical History:  Past Medical History:   Diagnosis Date    Hypermobile joints     arms, hip, upper back    Scoliosis        Past Surgical History:  Past Surgical History:   Procedure Laterality Date    HX  SECTION      HX OTHER SURGICAL             Family History:  Family History   Problem Relation Age of Onset    Family history unknown: Yes       Social History:  Social History Substance Use Topics    Smoking status: Current Every Day Smoker    Smokeless tobacco: Never Used    Alcohol use No       Allergies: Allergies   Allergen Reactions    Petrolatum [Mineral Oil-Hydrophil Petrolat] Anaphylaxis and Rash    Codeine Nausea and Vomiting    Tramadol Nausea and Vomiting       Patient's primary care provider (as noted in EPIC):  Chiquita Garcia MD    Review of Systems   Gastrointestinal: Positive for abdominal pain, diarrhea, nausea and vomiting. All other systems reviewed and are negative. Visit Vitals    /66 (BP 1 Location: Left arm, BP Patient Position: At rest)    Pulse 90    Temp 99.2 °F (37.3 °C)    Resp 18    Ht 5' 2\" (1.575 m)    Wt 57.6 kg (127 lb)    SpO2 97%    BMI 23.23 kg/m2       PHYSICAL EXAM:    CONSTITUTIONAL:  Alert, in no apparent distress;  well developed;  well nourished. HEAD:  Normocephalic, atraumatic. EYES:  EOMI. Non-icteric sclera. Normal conjunctiva. ENTM:  Nose:  no rhinorrhea. Throat:  no erythema or exudate, mucous membranes moist.  NECK:  No JVD. Supple  RESPIRATORY:  Chest clear, equal breath sounds, good air movement. CARDIOVASCULAR:  Regular rate and rhythm. No murmurs, rubs, or gallops. GI:  Normal bowel sounds, abdomen soft and non-tender. No rebound or guarding. No palpable masses. BACK:  Non-tender. UPPER EXT:  Normal inspection. LOWER EXT:  No edema, no calf tenderness. Distal pulses intact. NEURO:  Moves all four extremities, and grossly normal motor exam.  SKIN:  No rashes;  Normal for age. PSYCH:  Alert and normal affect. DIFFERENTIAL DIAGNOSES/ MEDICAL DECISION MAKING:  Viral vomiting and diarrhea, food poisoning, bacterial vomiting and diarrhea. Lower on differential diagnosis in this patient is early small bowel obstruction (given diarrhea as well), Clostridium difficile related diarrhea, irritable bowel syndrome, crohn's disease, or ulcerative colitis.     Diagnostic Study Results Abnormal lab results from this emergency department encounter:  Labs Reviewed   CBC WITH AUTOMATED DIFF - Abnormal; Notable for the following:        Result Value    WBC 15.4 (*)     MPV 8.7 (*)     NEUTROPHILS 88 (*)     LYMPHOCYTES 8 (*)     ABS. NEUTROPHILS 13.6 (*)     All other components within normal limits   METABOLIC PANEL, BASIC - Abnormal; Notable for the following:     Potassium 3.2 (*)     Glucose 101 (*)     BUN/Creatinine ratio 10 (*)     All other components within normal limits   HEPATIC FUNCTION PANEL - Abnormal; Notable for the following: Albumin 3.2 (*)     All other components within normal limits   URINALYSIS W/ RFLX MICROSCOPIC - Abnormal; Notable for the following:     Protein TRACE (*)     Ketone TRACE (*)     Bilirubin SMALL (*)     Blood TRACE (*)     Leukocyte Esterase TRACE (*)     All other components within normal limits   URINE MICROSCOPIC ONLY - Abnormal; Notable for the following:     Bacteria 1+ (*)     Mucus 1+ (*)     CA Oxalate crystals 2+ (*)     All other components within normal limits   LIPASE   HCG URINE, QL       Lab values for this patient within approximately the last 12 hours:  Recent Results (from the past 12 hour(s))   CBC WITH AUTOMATED DIFF    Collection Time: 04/11/18 11:32 AM   Result Value Ref Range    WBC 15.4 (H) 4.6 - 13.2 K/uL    RBC 4.29 4. 20 - 5.30 M/uL    HGB 12.5 12.0 - 16.0 g/dL    HCT 37.6 35.0 - 45.0 %    MCV 87.6 74.0 - 97.0 FL    MCH 29.1 24.0 - 34.0 PG    MCHC 33.2 31.0 - 37.0 g/dL    RDW 12.9 11.6 - 14.5 %    PLATELET 455 318 - 082 K/uL    MPV 8.7 (L) 9.2 - 11.8 FL    NEUTROPHILS 88 (H) 40 - 73 %    LYMPHOCYTES 8 (L) 21 - 52 %    MONOCYTES 3 3 - 10 %    EOSINOPHILS 1 0 - 5 %    BASOPHILS 0 0 - 2 %    ABS. NEUTROPHILS 13.6 (H) 1.8 - 8.0 K/UL    ABS. LYMPHOCYTES 1.2 0.9 - 3.6 K/UL    ABS. MONOCYTES 0.4 0.05 - 1.2 K/UL    ABS. EOSINOPHILS 0.1 0.0 - 0.4 K/UL    ABS.  BASOPHILS 0.0 0.0 - 0.1 K/UL    DF AUTOMATED     METABOLIC PANEL, BASIC Collection Time: 04/11/18 11:32 AM   Result Value Ref Range    Sodium 142 136 - 145 mmol/L    Potassium 3.2 (L) 3.5 - 5.5 mmol/L    Chloride 107 100 - 108 mmol/L    CO2 27 21 - 32 mmol/L    Anion gap 8 3.0 - 18 mmol/L    Glucose 101 (H) 74 - 99 mg/dL    BUN 7 7.0 - 18 MG/DL    Creatinine 0.69 0.6 - 1.3 MG/DL    BUN/Creatinine ratio 10 (L) 12 - 20      GFR est AA >60 >60 ml/min/1.73m2    GFR est non-AA >60 >60 ml/min/1.73m2    Calcium 8.6 8.5 - 10.1 MG/DL   LIPASE    Collection Time: 04/11/18 11:32 AM   Result Value Ref Range    Lipase 177 73 - 393 U/L   HEPATIC FUNCTION PANEL    Collection Time: 04/11/18 11:32 AM   Result Value Ref Range    Protein, total 6.9 6.4 - 8.2 g/dL    Albumin 3.2 (L) 3.4 - 5.0 g/dL    Globulin 3.7 2.0 - 4.0 g/dL    A-G Ratio 0.9 0.8 - 1.7      Bilirubin, total 0.2 0.2 - 1.0 MG/DL    Bilirubin, direct <0.1 0.0 - 0.2 MG/DL    Alk. phosphatase 49 45 - 117 U/L    AST (SGOT) 29 15 - 37 U/L    ALT (SGPT) 30 13 - 56 U/L   URINALYSIS W/ RFLX MICROSCOPIC    Collection Time: 04/11/18 11:50 AM   Result Value Ref Range    Color DARK YELLOW      Appearance CLOUDY      Specific gravity 1.025 1.005 - 1.030      pH (UA) 5.5 5.0 - 8.0      Protein TRACE (A) NEG mg/dL    Glucose NEGATIVE  NEG mg/dL    Ketone TRACE (A) NEG mg/dL    Bilirubin SMALL (A) NEG      Blood TRACE (A) NEG      Urobilinogen 1.0 0.2 - 1.0 EU/dL    Nitrites NEGATIVE  NEG      Leukocyte Esterase TRACE (A) NEG     HCG URINE, QL    Collection Time: 04/11/18 11:50 AM   Result Value Ref Range    HCG urine, QL NEGATIVE  NEG     URINE MICROSCOPIC ONLY    Collection Time: 04/11/18 11:50 AM   Result Value Ref Range    WBC 0 to 3 0 - 4 /hpf    RBC 0 to 1 0 - 5 /hpf    Epithelial cells 2+ 0 - 5 /lpf    Bacteria 1+ (A) NEG /hpf    Mucus 1+ (A) NEG /lpf    CA Oxalate crystals 2+ (A) NEG       Radiologist and cardiologist interpretations if available at time of this note:  No results found.     Medication(s) ordered for patient during this emergency visit encounter:  Medications   sodium chloride 0.9 % bolus infusion 1,000 mL (1,000 mL IntraVENous New Bag 4/11/18 1148)   ondansetron (ZOFRAN) injection 4 mg (4 mg IntraVENous Given 4/11/18 1148)       Medical Decision Making     I am the first provider for this patient. I reviewed the vital signs, available nursing notes, past medical history, past surgical history, family history and social history. Vital Signs:  Reviewed the patient's vital signs. ED COURSE:    WBC improved from 21.9 on 4/7/2018 to 15.4 today. IMPRESSION AND MEDICAL DECISION MAKING:  Based upon the patient's presentation with noted HPI and PE, along with the work up done in the emergency department, I believe that the patient is having vomiting, diarrhea, and abdominal pain from gastroenteritis. I do believe though that the patient is well enough for discharge home. Will prescribe zofran for nausea and vomiting, and lomotil for diarrhea. If vicodin was prescribed, only a short course was prescribed for breakthrough pain. DIAGNOSIS:  1. Vomiting  2. Diarrhea  3. Abdominal pain  4. Probable gastroenteritis     SPECIFIC PATIENT INSTRUCTIONS FROM THE PHYSICIAN WHO TREATED YOU IN THE ER TODAY:  1. Zofran for nausea and/or vomiting. 2. Over the counter imodium for diarrhea. 3. Ciprofloxacin as previously prescribed. 4. FOLLOW UP APPOINTMENT:  Your primary doctor in 3-4 days. Patient is improved, resting quietly and comfortably. The patient will be discharged home. The patient was reassured that these symptoms do not appear to represent a serious or life threatening condition at this time. Warning signs of worsening condition were discussed and understood by the patient. Based on patient's age, coexisting illness, exam, and the results of this ED evaluation, the decision to treat as an outpatient was made.  Based on the information available at time of discharge, acute pathology requiring immediate intervention was deemed relative unlikely. While it is impossible to completely exclude the possibility of underlying serious disease or worsening of condition, I feel the relative likelihood is extremely low. I discussed this uncertainty with the patient, who understood ED evaluation and treatment and felt comfortable with the outpatient treatment plan. All questions regarding care, test results, and follow up were answered. The patient is stable and appropriate to discharge. They understand that they should return to the emergency department for any new or worsening symptoms. I stressed the importance of follow up for repeat assessment and possibly further evaluation/treatment. Coding Diagnoses     Clinical Impression:   1. Abdominal pain, generalized    2. Non-intractable vomiting with nausea, unspecified vomiting type    3. Diarrhea, unspecified type    4. Gastroenteritis        Disposition     Disposition:  Home. TRU Dewey Board Certified Emergency Physician    Provider Attestation:  If a scribe was utilized in generation of this patient record, I personally performed the services described in the documentation, reviewed the documentation, as recorded by the scribe in my presence, and it accurately records the patient's history of presenting illness, review of systems, patient physical examination, and procedures performed by me as the attending physician. TRU Dewey Board Certified Emergency Physician  4/11/2018.  11:16 AM    Scribe [employer] acting as a scribe for and in the presence of Micheal Emanuel MD      April 11, 2018 at 11:19 AM       Provider Attestation:      I personally performed the services described in the documentation, reviewed the documentation, as recorded by the scribe in my presence, and it accurately and completely records my words and actions.  April 11, 2018 at 11:19 AM - Micheal Emanuel MD

## 2018-04-11 NOTE — ED NOTES
ED tech at bedside attempting PIV access and blood draw. Pt instructed to provide clean catch urine specimen after blood draw and verbalizes understanding.

## 2018-04-14 LAB
BACTERIA SPEC CULT: NORMAL
BACTERIA SPEC CULT: NORMAL
SERVICE CMNT-IMP: NORMAL
SERVICE CMNT-IMP: NORMAL

## 2018-09-16 PROCEDURE — 99283 EMERGENCY DEPT VISIT LOW MDM: CPT

## 2018-09-17 ENCOUNTER — HOSPITAL ENCOUNTER (EMERGENCY)
Age: 28
Discharge: HOME | End: 2018-09-17
Attending: EMERGENCY MEDICINE | Admitting: EMERGENCY MEDICINE
Payer: MEDICAID

## 2018-09-17 ENCOUNTER — HOSPITAL ENCOUNTER (EMERGENCY)
Age: 28
Discharge: HOME | End: 2018-09-17
Attending: EMERGENCY MEDICINE
Payer: MEDICAID

## 2018-09-17 VITALS
HEART RATE: 69 BPM | OXYGEN SATURATION: 97 % | SYSTOLIC BLOOD PRESSURE: 104 MMHG | RESPIRATION RATE: 16 BRPM | TEMPERATURE: 96.8 F | DIASTOLIC BLOOD PRESSURE: 64 MMHG

## 2018-09-17 VITALS
HEIGHT: 62 IN | BODY MASS INDEX: 23.92 KG/M2 | DIASTOLIC BLOOD PRESSURE: 75 MMHG | SYSTOLIC BLOOD PRESSURE: 110 MMHG | HEART RATE: 67 BPM | OXYGEN SATURATION: 96 % | WEIGHT: 130 LBS | RESPIRATION RATE: 16 BRPM | TEMPERATURE: 97.1 F

## 2018-09-17 DIAGNOSIS — S05.02XA ABRASION OF LEFT CORNEA, INITIAL ENCOUNTER: Primary | ICD-10-CM

## 2018-09-17 DIAGNOSIS — S05.02XD ABRASION OF LEFT CORNEA, SUBSEQUENT ENCOUNTER: ICD-10-CM

## 2018-09-17 PROCEDURE — 74011250637 HC RX REV CODE- 250/637: Performed by: EMERGENCY MEDICINE

## 2018-09-17 PROCEDURE — 74011000250 HC RX REV CODE- 250: Performed by: EMERGENCY MEDICINE

## 2018-09-17 PROCEDURE — 99282 EMERGENCY DEPT VISIT SF MDM: CPT

## 2018-09-17 RX ORDER — OXYCODONE AND ACETAMINOPHEN 5; 325 MG/1; MG/1
TABLET ORAL
Qty: 12 TAB | Refills: 0 | Status: SHIPPED | OUTPATIENT
Start: 2018-09-17 | End: 2019-08-12

## 2018-09-17 RX ORDER — PROPARACAINE HYDROCHLORIDE 5 MG/ML
1 SOLUTION/ DROPS OPHTHALMIC
Status: DISCONTINUED | OUTPATIENT
Start: 2018-09-17 | End: 2018-09-17 | Stop reason: HOSPADM

## 2018-09-17 RX ORDER — ERYTHROMYCIN 5 MG/G
OINTMENT OPHTHALMIC
Status: COMPLETED | OUTPATIENT
Start: 2018-09-17 | End: 2018-09-17

## 2018-09-17 RX ORDER — ERYTHROMYCIN 5 MG/G
3.5 OINTMENT OPHTHALMIC EVERY 6 HOURS
Qty: 1 TUBE | Refills: 0 | Status: SHIPPED | OUTPATIENT
Start: 2018-09-17 | End: 2019-08-12

## 2018-09-17 RX ORDER — ERYTHROMYCIN 5 MG/G
OINTMENT OPHTHALMIC
Status: DISCONTINUED | OUTPATIENT
Start: 2018-09-17 | End: 2018-09-17 | Stop reason: SDUPTHER

## 2018-09-17 RX ORDER — PROPARACAINE HYDROCHLORIDE 5 MG/ML
1 SOLUTION/ DROPS OPHTHALMIC
Status: COMPLETED | OUTPATIENT
Start: 2018-09-17 | End: 2018-09-17

## 2018-09-17 RX ADMIN — FLUORESCEIN SODIUM 1 STRIP: 0.6 STRIP OPHTHALMIC at 01:49

## 2018-09-17 RX ADMIN — ERYTHROMYCIN: 5 OINTMENT OPHTHALMIC at 01:53

## 2018-09-17 RX ADMIN — PROPARACAINE HYDROCHLORIDE 1 DROP: 5 SOLUTION/ DROPS OPHTHALMIC at 01:50

## 2018-09-17 NOTE — ED PROVIDER NOTES
EMERGENCY DEPARTMENT HISTORY AND PHYSICAL EXAM 
 
11:26 AM 
 
 
Date: 9/17/2018 Patient Name: Jose M Lane History of Presenting Illness Chief Complaint Patient presents with  Eye Pain L eye History Provided By: Patient Chief Complaint: Eye pain Duration:  Last night Timing:  Constant Location: Left eye Quality: N/A Severity: Severe Modifying Factors: The patient was seen at [facility] last night for similar pain, but her pain continues to persist 
Associated Symptoms: denies any other associated signs or symptoms Additional History (Context): Jose M Lane is a 33 y.o. female who presents with constant severe left eye pain that began last night as she states a coloring book had fallen and hit her. The patient denies any other associated signs or symptoms. The patient was seen at [facility] last night for similar, but her pain continues to persist. She was diagnosed with a corneal abrasion. The patient was given antibiotics. The patient has taking Tylenol without improvement. The patient has an appointment with opthalmology tomorrow. Does not wear contacts. PCP: Wallace Lakhani MD 
 
Current Facility-Administered Medications Medication Dose Route Frequency Provider Last Rate Last Dose  fluorescein (FLU-CONRAD) 0.6 mg ophthalmic strip 1 Strip  1 Strip Left Eye NOW Cate Liang  proparacaine (OPTHAINE) 0.5 % ophthalmic solution 1 Drop  1 Drop Left Eye NOW Cate Liang Current Outpatient Prescriptions Medication Sig Dispense Refill  oxyCODONE-acetaminophen (PERCOCET) 5-325 mg per tablet Take 1 tablet every 4-6 hours as needed for pain control. If you were instructed to try over the counter ibuprofen or tylenol, only take the percocet for pain not controlled with the over the counter medication. 12 Tab 0  
 erythromycin (ILOTYCIN) ophthalmic ointment Administer 3.5 g to left eye every six (6) hours.  1 Tube 0  
  ondansetron hcl (ZOFRAN, AS HYDROCHLORIDE,) 4 mg tablet Take 2 Tabs by mouth every eight (8) hours as needed for Nausea. 12 Tab 0  
 norethindrone-ethinyl estradiol (ORTHO-NOVUM 1-35 TAB, NORTREL 1-35 TAB) 1-35 mg-mcg tab Take 1 Tab by mouth daily. Past History Past Medical History: 
Past Medical History:  
Diagnosis Date  Hypermobile joints   
 arms, hip, upper back  Scoliosis Past Surgical History: 
Past Surgical History:  
Procedure Laterality Date  HX  SECTION    
 HX OTHER SURGICAL    
  Family History: 
Family History Problem Relation Age of Onset  Family history unknown: Yes  
 
 
Social History: 
Social History Substance Use Topics  Smoking status: Current Every Day Smoker  Smokeless tobacco: Never Used  Alcohol use No  
 
 
Allergies: Allergies Allergen Reactions  Petrolatum [Mineral Oil-Hydrophil Petrolat] Anaphylaxis and Rash  Codeine Nausea and Vomiting  Tramadol Nausea and Vomiting Review of Systems Review of Systems Constitutional: Negative. Negative for chills, diaphoresis and fever. HENT: Negative. Negative for congestion, rhinorrhea and sore throat. Eyes: Positive for pain. Negative for discharge and redness. Respiratory: Negative. Negative for cough, chest tightness, shortness of breath and wheezing. Cardiovascular: Negative. Negative for chest pain. Gastrointestinal: Negative. Negative for abdominal pain, constipation, diarrhea, nausea and vomiting. Genitourinary: Negative. Negative for dysuria, flank pain, frequency, hematuria and urgency. Musculoskeletal: Negative. Negative for back pain and neck pain. Skin: Negative. Negative for rash. Neurological: Negative. Negative for syncope, weakness, numbness and headaches. Psychiatric/Behavioral: Negative. All other systems reviewed and are negative. Physical Exam  
 
Visit Vitals  /75 (BP 1 Location: Right arm, BP Patient Position: At rest;Sitting)  Pulse 67  Temp 97.1 °F (36.2 °C)  Resp 16  
 Ht 5' 2\" (1.575 m)  Wt 59 kg (130 lb)  SpO2 96%  BMI 23.78 kg/m2 Physical Exam  
Constitutional: She is oriented to person, place, and time. She appears well-developed and well-nourished. Non-toxic appearance. She does not have a sickly appearance. She does not appear ill. No distress. HENT:  
Head: Normocephalic and atraumatic. Mouth/Throat: Oropharynx is clear and moist. No oropharyngeal exudate. Eyes: EOM are normal. Pupils are equal, round, and reactive to light. Left conjunctiva is injected. Left conjunctiva has no hemorrhage. No scleral icterus. Slit lamp exam: The right eye shows no corneal abrasion. The left eye shows corneal abrasion. Neck: Normal range of motion. Neck supple. No hepatojugular reflux and no JVD present. No tracheal deviation present. No thyromegaly present. Cardiovascular: Normal rate, regular rhythm, S1 normal, S2 normal, normal heart sounds, intact distal pulses and normal pulses. Exam reveals no gallop, no S3 and no S4. No murmur heard. Pulses: 
     Radial pulses are 2+ on the right side, and 2+ on the left side. Dorsalis pedis pulses are 2+ on the right side, and 2+ on the left side. Pulmonary/Chest: Effort normal and breath sounds normal. No respiratory distress. She has no decreased breath sounds. She has no wheezes. She has no rhonchi. She has no rales. Abdominal: Soft. Normal appearance and bowel sounds are normal. She exhibits no distension and no mass. There is no hepatosplenomegaly. There is no tenderness. There is no rigidity, no rebound, no guarding, no CVA tenderness, no tenderness at McBurney's point and negative Herrera's sign. Musculoskeletal: Normal range of motion. Strength 5/5 throughout Lymphadenopathy: Head (right side): No submental, no submandibular, no preauricular and no occipital adenopathy present. Head (left side): No submental, no submandibular, no preauricular and no occipital adenopathy present. She has no cervical adenopathy. Right: No supraclavicular adenopathy present. Left: No supraclavicular adenopathy present. Neurological: She is alert and oriented to person, place, and time. She has normal strength and normal reflexes. She is not disoriented. No cranial nerve deficit or sensory deficit. Coordination and gait normal. GCS eye subscore is 4. GCS verbal subscore is 5. GCS motor subscore is 6. Grossly intact Skin: Skin is warm, dry and intact. No rash noted. She is not diaphoretic. Psychiatric: She has a normal mood and affect. Her speech is normal and behavior is normal. Judgment and thought content normal. Cognition and memory are normal.  
Nursing note and vitals reviewed. Diagnostic Study Results Labs - No results found for this or any previous visit (from the past 12 hour(s)). Radiologic Studies - No orders to display Medical Decision Making Provider Notes (Medical Decision Making): MDM Number of Diagnoses or Management Options Abrasion of left cornea, initial encounter: Abrasion of left cornea, subsequent encounter: established, worsening I am the first provider for this patient. I reviewed the vital signs, available nursing notes, past medical history, past surgical history, family history and social history. Vital Signs-Reviewed the patient's vital signs. Records Reviewed: Nursing Notes and Old Medical Records (Time of Review: 11:26 AM) 
 
ED Course: Progress Notes, Reevaluation, and Consults: 
 
 
Able to control pain with drops. Corneal abrasion unchanged. Patient to eye Opthalmologist today. Diagnosis I have reassessed the patient. Patient is feeling well.   Patient will be prescribed Percocet. Patient was discharged in stable condition. Patient is to return to emergency department if any new or worsening condition. Clinical Impression: 1. Abrasion of left cornea, initial encounter 2. Abrasion of left cornea, subsequent encounter Disposition: home Follow-up Information Follow up With Details Comments Contact Info [facility] Schedule an appointment as soon as possible for a visit  [address] [place] [id number] [phone]  
  
  
 
_______________________________ Attestations: 
Scribe Attestation Abel Lara acting as a scribe for and in the presence of Cher Price DO September 17, 2018 at 11:52 AM 
    
Provider Attestation:     
I personally performed the services described in the documentation, reviewed the documentation, as recorded by the scribe in my presence, and it accurately and completely records my words and actions. September 17, 2018 at 11:52 AM - Sarai Guy DO   
_______________________________

## 2018-09-17 NOTE — Clinical Note
You were seen here for eye pain. Please call both Opthalmology resources listed below and see which one can see you sooner for your corneal abrasion. The evaluation and treatment done today requires that you follow up with a physician for re-evaluation. Medical problems can change over time and symptoms can get worse or new symptoms can develop over time, therefore, it is important that you follow up as we discussed. Please immediately return to the ER if you have any concerns. Call the ER if you hav e any questions about what we discussed. At the [facility] Emergency Department we are genuinely concerned about your health and comfort. You may be selected to participate in a patient satisfaction survey mailed to your home. We are excit ed about the opportunity to learn from your experience so we may continue to improve. In striving for the very best we believe good is not good enough, but if you rate us as EXCELLENT in all boxes, we have succeeded. We strive to provide EXCELLENT care t o you and your family. We appreciate the opportunity to take care of you, and hope you do well.

## 2018-09-17 NOTE — ED TRIAGE NOTES
Pt was grabbing a coloring book off the fridge when the edge of a book hit her eye. Pt c/o 10/10 pain, states the light burns her eye.

## 2018-09-17 NOTE — ED NOTES
I have reviewed discharge instructions with the patient. The patient verbalized understanding. Pt left ED with severe left eye pain, numbing medication provided prior to discharge.  Pt states she will follow up with an eye MD in the am.

## 2018-09-17 NOTE — ED PROVIDER NOTES
EMERGENCY DEPARTMENT HISTORY AND PHYSICAL EXAM 
 
1:39 AM 
 
 
Date: 2018 Patient Name: Stan Zimmerman History of Presenting Illness Chief Complaint Patient presents with [address] [place] [address] Injury  Eye Pain History Provided By: Patient Chief Complaint: left eye pain Duration:  1 hour Timing:  Acute Location: left eye Quality: burning Severity: 10/10 Modifying Factors: book hit her eye Associated Symptoms: photophobia Additional History (Context): Stan Zimmerman is a 33 y.o. female who presents to the ED complaining of burning, left eye pain that started 1 hour ago. Patient explains that she was grabbing a coloring book off of her fridge when it fell and the edge directly hit her eye. She rates her pain a 10/10 and reports associated photophobia. She does not wear any contacts. No other complaints or concerns at this time. PCP: Chiquita Garcia MD 
 
Current Outpatient Prescriptions Medication Sig Dispense Refill  erythromycin (ILOTYCIN) ophthalmic ointment Administer 3.5 g to left eye every six (6) hours. 1 Tube 0  
 ondansetron hcl (ZOFRAN, AS HYDROCHLORIDE,) 4 mg tablet Take 2 Tabs by mouth every eight (8) hours as needed for Nausea. 12 Tab 0  
 norethindrone-ethinyl estradiol (ORTHO-NOVUM 1-35 TAB, NORTREL 1-35 TAB) 1-35 mg-mcg tab Take 1 Tab by mouth daily. Past History Past Medical History: 
Past Medical History:  
Diagnosis Date  Hypermobile joints   
 arms, hip, upper back  Scoliosis Past Surgical History: 
Past Surgical History:  
Procedure Laterality Date  HX  SECTION    
 HX OTHER SURGICAL    
  Family History: 
Family History Problem Relation Age of Onset  Family history unknown: Yes  
 
 
Social History: 
Social History Substance Use Topics  Smoking status: Current Every Day Smoker  Smokeless tobacco: Never Used  Alcohol use No  
 
 
Allergies: Allergies Allergen Reactions  Petrolatum [Mineral Oil-Hydrophil Petrolat] Anaphylaxis and Rash  Codeine Nausea and Vomiting  Tramadol Nausea and Vomiting Review of Systems Review of Systems Eyes: Positive for photophobia (left eye) and pain (left). Neurological: Negative for dizziness and headaches. All other systems reviewed and are negative. Physical Exam  
 
Patient Vitals for the past 12 hrs: 
 Temp Pulse Resp BP SpO2  
09/17/18 0004 - - - - 97 % 09/17/18 0002 96.8 °F (36 °C) 69 16 104/64 97 % Physical Exam  
Constitutional: She is oriented to person, place, and time. She appears well-developed. HENT:  
Head: Normocephalic and atraumatic. Eyes: Conjunctivae and EOM are normal. Pupils are equal, round, and reactive to light. Slit lamp exam: 
     The left eye shows corneal abrasion and fluorescein uptake. The left eye shows no foreign body. Left corneal abrasion along the 9 o'clock position. No foreign bodies. Reassuring visual acuity (see nursing notes). Mild photophobia which improved after proparacaine drops. Neck: Normal range of motion. Cardiovascular: Normal heart sounds. Exam reveals no gallop and no friction rub. No murmur heard. Pulmonary/Chest: Effort normal and breath sounds normal. No stridor. Abdominal: Soft. There is no tenderness. Musculoskeletal: Normal range of motion. She exhibits no tenderness. Neurological: She is alert and oriented to person, place, and time. Skin: Skin is warm and dry. She is not diaphoretic. Psychiatric: She has a normal mood and affect. Her behavior is normal.  
Nursing note and vitals reviewed. Diagnostic Study Results Labs - No results found for this or any previous visit (from the past 12 hour(s)). Radiologic Studies - No orders to display Medical Decision Making Provider Notes (Medical Decision Making):  
Based on my history, physical exam, and diagnostic evaluation, the patient appears to have symptoms consistent with a left sided corneal abrasion. There is a history of trauma: patient was grabbing a book from her fridge when it fell and the edge of the book hit her eye directly. Denies FB by history. The pt has increased uptake of dye on fluorescein exam. Examination showed a left corneal abrasion and mild photophobia which improved after proparacaine drops. Pt does not wear contact lenses. I will discharge the pt with erythromycin and follow up with Opthamology within then next 48 hours/ASAP. Patient and family voiced understanding of the plan and were agreeable. All questions were answered. I am the first provider for this patient. I reviewed the vital signs, available nursing notes, past medical history, past surgical history, family history and social history. Vital Signs-Reviewed the patient's vital signs. Pulse Oximetry Analysis -  97% on room air (Interpretation) wnl  
 
Cardiac Monitor: 
Rate: 69 bpm  
Rhythm:  Normal Sinus Rhythm Records Reviewed: Nursing Notes (Time of Review: 1:39 AM) Diagnosis Clinical Impression: 1. Abrasion of left cornea, initial encounter Disposition: Discharge to home Follow-up Information Follow up With Details Comments Contact Info [facility] Schedule an appointment as soon as possible for a visit today  [address] [facility] [address] [facility] Jeremy Coronado MD Schedule an appointment as soon as possible for a visit today  [address] [place] [address] 
[phone] [facility] EMERGENCY DEPT Go to As needed, If symptoms worsen [address] [place] [address] [facility] [address] Discharge Medication List as of 9/17/2018  1:58 AM  
  
START taking these medications Details  
erythromycin (ILOTYCIN) ophthalmic ointment Administer 3.5 g to left eye every six (6) hours. , Print, Disp-1 Tube, R-0  
  
  
CONTINUE these medications which have NOT CHANGED Details  
ondansetron hcl (ZOFRAN, AS HYDROCHLORIDE,) 4 mg tablet Take 2 Tabs by mouth every eight (8) hours as needed for Nausea. , Print, Disp-12 Tab, R-0  
  
norethindrone-ethinyl estradiol (ORTHO-NOVUM 1-35 TAB, NORTREL 1-35 TAB) 1-35 mg-mcg tab Take 1 Tab by mouth daily. , Historical Med  
  
  
 
_______________________________ Attestations: 
Scribe Attestation Salome Jun acting as a scribe for and in the presence of Ralph Sepulveda MD     
September 17, 2018 at 1:39 AM 
    
Provider Attestation:     
I personally performed the services described in the documentation, reviewed the documentation, as recorded by the scribe in my presence, and it accurately and completely records my words and actions. September 17, 2018 at 1:39 AM - Ralph Sepulveda MD   
_______________________________

## 2018-09-17 NOTE — LETTER
[facility] EMERGENCY DEPT 
[address] [facility] [address] 
[phone] Work/School Note Date: 9/16/2018 To Whom It May concern: 
 
Cony Lan was seen and treated today in the emergency room by the following provider(s): 
Attending Provider: Neetu Julio MD. Cony Lan may return to work on 9/20/2018. Sincerely, 
 
 
 
 
Tala Melchor

## 2018-09-17 NOTE — DISCHARGE INSTRUCTIONS
Corneal Scratches: Care Instructions  Your Care Instructions    The cornea is the clear surface that covers the front of the eye. When a speck of dirt, a wood chip, an insect, or another object flies into your eye, it can cause a painful scratch on the cornea. Wearing contact lenses too long or rubbing your eyes can also scratch the cornea. Small scratches usually heal in a day or two. Deeper scratches may take longer. If you have had a foreign object removed from your eye or you have a corneal scratch, you will need to watch for infection and vision problems while your eye heals. Follow-up care is a key part of your treatment and safety. Be sure to make and go to all appointments, and call your doctor if you are having problems. It's also a good idea to know your test results and keep a list of the medicines you take. How can you care for yourself at home? · The doctor probably used a medicine during your exam to numb your eye. When it wears off in 30 to 60 minutes, your eye pain may come back. Take pain medicines exactly as directed. ¨ If the doctor gave you a prescription medicine for pain, take it as prescribed. ¨ If you are not taking a prescription pain medicine, ask your doctor if you can take an over-the-counter medicine. ¨ Do not take two or more pain medicines at the same time unless the doctor told you to. Many pain medicines have acetaminophen, which is Tylenol. Too much acetaminophen (Tylenol) can be harmful. · Do not rub your injured eye. Rubbing can make it worse. · Use the prescribed eyedrops or ointment as directed. Be sure the dropper or bottle tip is clean. To put in eyedrops or ointment:  ¨ Tilt your head back, and pull your lower eyelid down with one finger. ¨ Drop or squirt the medicine inside the lower lid. ¨ Close your eye for 30 to 60 seconds to let the drops or ointment move around. ¨ Do not touch the ointment or dropper tip to your eyelashes or any other surface.   · Do not use your contact lens in your hurt eye until your doctor says you can. Also, do not wear eye makeup until your eye has healed. · Do not drive if you have blurred vision. · Bright light may hurt. Sunglasses can help. · To prevent eye injuries in the future, wear safety glasses or goggles when you work with machines or tools, mow the lawn, or ride a bike or motorcycle. When should you call for help? Call your doctor now or seek immediate medical care if:    · You have signs of an eye infection, such as:  ¨ Pus or thick discharge coming from the eye. ¨ Redness or swelling around the eye. ¨ A fever.     · You have new or worse eye pain.     · You have vision changes.     · It feels like there is something in your eye.     · Light hurts your eye.    Watch closely for changes in your health, and be sure to contact your doctor if:    · You do not get better as expected. Where can you learn more? Go to http://aziza-kasie.info/. Enter V392 in the search box to learn more about \"Corneal Scratches: Care Instructions. \"  Current as of: December 3, 2017  Content Version: 11.7  © 6220-7592 [employer]. Care instructions adapted under license by [employer] (which disclaims liability or warranty for this information). If you have questions about a medical condition or this instruction, always ask your healthcare professional. [place] [address] any warranty or liability for your use of this information.

## 2019-01-24 ENCOUNTER — OFFICE VISIT (OUTPATIENT)
Dept: ORTHOPEDIC SURGERY | Age: 29
End: 2019-01-24

## 2019-01-24 VITALS
BODY MASS INDEX: 23.59 KG/M2 | OXYGEN SATURATION: 97 % | HEIGHT: 62 IN | WEIGHT: 128.2 LBS | RESPIRATION RATE: 16 BRPM | HEART RATE: 66 BPM | TEMPERATURE: 98.2 F | SYSTOLIC BLOOD PRESSURE: 107 MMHG | DIASTOLIC BLOOD PRESSURE: 73 MMHG

## 2019-01-24 DIAGNOSIS — M25.561 RIGHT KNEE PAIN, UNSPECIFIED CHRONICITY: ICD-10-CM

## 2019-01-24 DIAGNOSIS — S83.241A ACUTE MEDIAL MENISCAL TEAR, RIGHT, INITIAL ENCOUNTER: Primary | ICD-10-CM

## 2019-01-24 NOTE — PROGRESS NOTES
1. Have you been to the ER, urgent care clinic since your last visit? Hospitalized since your last visit? NO 
 
2. Have you seen or consulted any other health care providers outside of the [address] [place] [address] since your last visit? Include any pap smears or colon screening.  NO
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
## 2019-03-06 ENCOUNTER — HOSPITAL ENCOUNTER (OUTPATIENT)
Age: 29
Discharge: HOME | End: 2019-03-06
Attending: ORTHOPAEDIC SURGERY
Payer: MEDICAID

## 2019-03-06 DIAGNOSIS — S83.241A ACUTE MEDIAL MENISCAL TEAR, RIGHT, INITIAL ENCOUNTER: ICD-10-CM

## 2019-03-06 DIAGNOSIS — M25.561 RIGHT KNEE PAIN, UNSPECIFIED CHRONICITY: ICD-10-CM

## 2019-03-06 PROCEDURE — 73721 MRI JNT OF LWR EXTRE W/O DYE: CPT

## 2019-03-14 ENCOUNTER — OFFICE VISIT (OUTPATIENT)
Dept: ORTHOPEDIC SURGERY | Age: 29
End: 2019-03-14

## 2019-03-14 VITALS
HEIGHT: 62 IN | BODY MASS INDEX: 23.19 KG/M2 | SYSTOLIC BLOOD PRESSURE: 113 MMHG | WEIGHT: 126 LBS | RESPIRATION RATE: 16 BRPM | DIASTOLIC BLOOD PRESSURE: 68 MMHG | HEART RATE: 80 BPM | OXYGEN SATURATION: 100 %

## 2019-03-14 DIAGNOSIS — M25.861 IMPINGEMENT SYNDROME INVOLVING PATELLAR FAT PAD OF RIGHT KNEE: Primary | ICD-10-CM
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
## 2019-03-19 ENCOUNTER — APPOINTMENT (OUTPATIENT)
Dept: PHYSICAL THERAPY | Age: 29
End: 2019-03-19

## 2019-03-22 ENCOUNTER — HOSPITAL ENCOUNTER (OUTPATIENT)
Dept: PHYSICAL THERAPY | Age: 29
Discharge: HOME | End: 2019-03-22
Payer: MEDICAID

## 2019-03-22 PROCEDURE — 97161 PT EVAL LOW COMPLEX 20 MIN: CPT

## 2019-03-22 PROCEDURE — 97535 SELF CARE MNGMENT TRAINING: CPT

## 2019-03-22 PROCEDURE — 97110 THERAPEUTIC EXERCISES: CPT
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
## 2019-03-28 ENCOUNTER — HOSPITAL ENCOUNTER (OUTPATIENT)
Dept: PHYSICAL THERAPY | Age: 29
Discharge: HOME | End: 2019-03-28
Payer: MEDICAID

## 2019-03-28 PROCEDURE — 97112 NEUROMUSCULAR REEDUCATION: CPT

## 2019-03-28 PROCEDURE — 97110 THERAPEUTIC EXERCISES: CPT
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
## 2019-04-02 ENCOUNTER — HOSPITAL ENCOUNTER (OUTPATIENT)
Dept: PHYSICAL THERAPY | Age: 29
Discharge: HOME | End: 2019-04-02
Payer: MEDICAID

## 2019-04-02 PROCEDURE — 97112 NEUROMUSCULAR REEDUCATION: CPT

## 2019-04-02 PROCEDURE — 97110 THERAPEUTIC EXERCISES: CPT

## 2019-04-02 NOTE — PROGRESS NOTES
PT DAILY TREATMENT NOTE 10-18 Patient Name: Susan Chamorro Date:2019 : 1990 [x]  Patient  Verified Payor: [employer] / Plan: [address] [place] [address] / Product Type: Managed Care Medicaid / In time:7:35  Out time:8:05 Total Treatment Time (min): 30 Visit #: 3 of 8 Medicare/BCBS Only Total Timed Codes (min):  30 1:1 Treatment Time:  24  
 
 
Treatment Area: Right knee pain [M25.561] SUBJECTIVE Pain Level (0-10 scale): 8/10 Any medication changes, allergies to medications, adverse drug reactions, diagnosis change, or new procedure performed?: [x] No    [] Yes (see summary sheet for update) Subjective functional status/changes:   [] No changes reported Pt reports having exructiating pain due to the tape over the weekend. OBJECTIVE 20 min Therapeutic Exercise:  [x] See flow sheet :  
Rationale: increase ROM and increase strength to improve the patients ability to tolerate ADLs. 10 min Neuromuscular Re-education:  [x]  See flow sheet :  
Rationale: increase strength, improve coordination and increase proprioception  to improve the patients ability to perform functional activities. With 
 [] TE 
 [] TA 
 [] neuro 
 [] other: Patient Education: [x] Review HEP [] Progressed/Changed HEP based on:  
[] positioning   [] body mechanics   [] transfers   [] heat/ice application   
[] other:   
 
Other Objective/Functional Measures: 
 
Held taping due to patients complaints. Initiated cook bridges and fire hydrants. Pain Level (0-10 scale) post treatment: 7/10 ASSESSMENT/Changes in Function: Pt demonstrates fair carry over with vc's to correct form with squats.  
 
Patient will continue to benefit from skilled PT services to modify and progress therapeutic interventions, address functional mobility deficits, address ROM deficits, address strength deficits, analyze and address soft tissue restrictions, analyze and cue movement patterns and analyze and modify body mechanics/ergonomics to attain remaining goals. []  See Plan of Care 
[]  See progress note/recertification 
[]  See Discharge Summary Progress towards goals / Updated goals: 
 
Short Term Goals: To be accomplished in 2 weeks: 
             1. Patient will report performance of HEP at least 1 times per day to facilitate improved outcomes and improved self management. 
  
Long Term Goals: To be accomplished in 4 weeks: 
             1. Patient will report FOTO score of 59 or better to indicate significant improvement in functional status.              2. Pt will demonstrate 4/5 hip ABD and 4+/5 ER to improve hip stability during activity and reduce patellofemoral knee pain. 
             3. Pt will demonstrate reciprocal stair negotiation void of impairments to improve ease of functional mobility. PLAN 
[]  Upgrade activities as tolerated     [x]  Continue plan of care 
[]  Update interventions per flow sheet      
[]  Discharge due to:_ 
[]  Other:_ Bandar Burk PTA 4/2/2019  7:43 AM 
 
Future Appointments Date Time Provider Christen Valenzuela 4/4/2019  7:30 AM [facility]  
4/9/2019  7:30 AM Antoine Castro PTA [facility]  
4/11/2019  9:30 AM [facility]  
4/11/2019 10:00 AM Freda Pickard MD [facility]  
4/15/2019  9:30 AM [facility]

## 2019-04-04 ENCOUNTER — HOSPITAL ENCOUNTER (OUTPATIENT)
Dept: PHYSICAL THERAPY | Age: 29
Discharge: HOME | End: 2019-04-04
Payer: MEDICAID

## 2019-04-04 PROCEDURE — 97112 NEUROMUSCULAR REEDUCATION: CPT

## 2019-04-04 PROCEDURE — 97110 THERAPEUTIC EXERCISES: CPT

## 2019-04-04 NOTE — PROGRESS NOTES
PT DAILY TREATMENT NOTE 10-18 Patient Name: Carin Yusuf Date:2019 : 1990 [x]  Patient  Verified Payor: [employer] / Plan: [address] [place] [address] / Product Type: Managed Care Medicaid / In time:7:32  Out time:8:00 Total Treatment Time (min): 28 Visit #: 4 of 8 Medicare/BCBS Only Total Timed Codes (min):  28 1:1 Treatment Time:  28 Treatment Area: Right knee pain [M25.561] SUBJECTIVE Pain Level (0-10 scale): 7/10 Any medication changes, allergies to medications, adverse drug reactions, diagnosis change, or new procedure performed?: [x] No    [] Yes (see summary sheet for update) Subjective functional status/changes:   [] No changes reported \"It's just been hurting; its flat. \" OBJECTIVE 18 min Therapeutic Exercise:  [] See flow sheet :  
Rationale: increase ROM and increase strength to improve the patients ability to tolerate ADLs. 10 min Neuromuscular Re-education:  []  See flow sheet :  
Rationale: increase strength, improve coordination and increase proprioception  to improve the patients ability to perform functional activities. With 
 [] TE 
 [] TA 
 [] neuro 
 [] other: Patient Education: [x] Review HEP [] Progressed/Changed HEP based on:  
[] positioning   [] body mechanics   [] transfers   [] heat/ice application   
[] other:   
 
Other Objective/Functional Measures: Pt continues to require vc's to correct form and avoid genu valgum. Pain Level (0-10 scale) post treatment: 7.5/10 ASSESSMENT/Changes in Function: Pt demonstrates continued poor carry over with vc's form.   
 
Patient will continue to benefit from skilled PT services to modify and progress therapeutic interventions, address functional mobility deficits, address ROM deficits, address strength deficits, analyze and address soft tissue restrictions, analyze and cue movement patterns and analyze and modify body mechanics/ergonomics to attain remaining goals. []  See Plan of Care 
[]  See progress note/recertification 
[]  See Discharge Summary Progress towards goals / Updated goals: 
Short Term Goals: To be accomplished in 2 weeks: 
             1. Patient will report performance of HEP at least 1 times per day to facilitate improved outcomes and improved self management. 
  
Long Term Goals: To be accomplished in 4 weeks: 
             1. Patient will report FOTO score of 59 or better to indicate significant improvement in functional status.              2. Pt will demonstrate 4/5 hip ABD and 4+/5 ER to improve hip stability during activity and reduce patellofemoral knee pain. 
             3. Pt will demonstrate reciprocal stair negotiation void of impairments to improve ease of functional mobility. PLAN 
[]  Upgrade activities as tolerated     [x]  Continue plan of care 
[]  Update interventions per flow sheet      
[]  Discharge due to:_ 
[]  Other:_ Roxanna Gonzalez PTA 4/4/2019  7:40 AM 
 
Future Appointments Date Time Provider Christen Bianca 4/9/2019  7:30 AM Michael Barba PTA [facility]  
4/11/2019  9:30 AM Surjit Sultana [facility]  
4/11/2019 10:00 AM MD Anik[facility] Sorensen [facility]  
4/15/2019  9:30 AM Surjit Sultana [facility]

## 2019-04-09 ENCOUNTER — HOSPITAL ENCOUNTER (OUTPATIENT)
Dept: PHYSICAL THERAPY | Age: 29
Discharge: HOME | End: 2019-04-09
Payer: MEDICAID

## 2019-04-09 PROCEDURE — 97110 THERAPEUTIC EXERCISES: CPT

## 2019-04-09 PROCEDURE — 97112 NEUROMUSCULAR REEDUCATION: CPT

## 2019-04-09 NOTE — PROGRESS NOTES
PT DAILY TREATMENT NOTE 10-18 Patient Name: Kimi Elizondo Date:2019 : 1990 [x]  Patient  Verified Payor: [employer]ICA[employer] / Plan: [address] [place] [address] / Product Type: Managed Care Medicaid / In time:7:36  Out time:8:00 Total Treatment Time (min): 24 Visit #: 5 of 8 Medicare/BCBS Only Total Timed Codes (min):  24 1:1 Treatment Time:  24  
 
 
Treatment Area: Right knee pain [M25.561] SUBJECTIVE Pain Level (0-10 scale): 6.5/10 Any medication changes, allergies to medications, adverse drug reactions, diagnosis change, or new procedure performed?: [x] No    [] Yes (see summary sheet for update) Subjective functional status/changes:   [] No changes reported Pt reports no new complaints of pain but denies and significant changes. OBJECTIVE 14 min Therapeutic Exercise:  [] See flow sheet :  
Rationale: increase ROM and increase strength to improve the patients ability to tolerate ADLs. 10 min Neuromuscular Re-education:  [x]  See flow sheet :  
Rationale: increase strength, improve coordination and increase proprioception  to improve the patients ability to perform functional activities. With 
 [] TE 
 [] TA 
 [] neuro 
 [] other: Patient Education: [x] Review HEP [] Progressed/Changed HEP based on:  
[] positioning   [] body mechanics   [] transfers   [] heat/ice application   
[] other:   
 
Other Objective/Functional Measures: initiated step ups Pain Level (0-10 scale) post treatment: 6/10 ASSESSMENT/Changes in Function: Pt had increased c/o pain when performing step up Patient will continue to benefit from skilled PT services to modify and progress therapeutic interventions, address functional mobility deficits, address ROM deficits, address strength deficits, analyze and address soft tissue restrictions, analyze and cue movement patterns and analyze and modify body mechanics/ergonomics to attain remaining goals. []  See Plan of Care 
[]  See progress note/recertification 
[]  See Discharge Summary Progress towards goals / Updated goals: 
Short Term Goals: To be accomplished in 2 weeks: 
             1. Patient will report performance of HEP at least 1 times per day to facilitate improved outcomes and improved self management. 
  
Long Term Goals: To be accomplished in 4 weeks: 
             1. Patient will report FOTO score of 59 or better to indicate significant improvement in functional status.              2. Pt will demonstrate 4/5 hip ABD and 4+/5 ER to improve hip stability during activity and reduce patellofemoral knee pain. 
             3. Pt will demonstrate reciprocal stair negotiation void of impairments to improve ease of functional mobility. 
  
PLAN 
[]  Upgrade activities as tolerated     [x]  Continue plan of care 
[]  Update interventions per flow sheet      
[]  Discharge due to:_ 
[]  Other:_ Jake Arce PTA 4/9/2019  8:00 AM 
 
Future Appointments Date Time Provider Christen Valenzuela 4/11/2019  8:00 AM Cain Rico [facility]  
4/11/2019 10:00 AM [facility]  
4/15/2019  9:30 AM Shruthi Eisenberg [facility]

## 2019-04-11 ENCOUNTER — APPOINTMENT (OUTPATIENT)
Dept: PHYSICAL THERAPY | Age: 29
End: 2019-04-11
Payer: MEDICAID

## 2019-04-15 ENCOUNTER — HOSPITAL ENCOUNTER (OUTPATIENT)
Dept: PHYSICAL THERAPY | Age: 29
Discharge: HOME | End: 2019-04-15
Payer: MEDICAID

## 2019-04-15 PROCEDURE — 97112 NEUROMUSCULAR REEDUCATION: CPT

## 2019-04-15 PROCEDURE — 97110 THERAPEUTIC EXERCISES: CPT

## 2019-04-15 NOTE — PROGRESS NOTES
PT DAILY TREATMENT NOTE  Patient Name: Nicolasa Alamo Date:4/15/2019 : 1990 [x]  Patient  Verified Payor: [employer]ICAID / Plan: [address] [place] [address] / Product Type: Managed Care Medicaid / In time:9:35am  Out time:10:08am 
Total Treatment Time (min): 33 Visit #: 6 of 8 Treatment Area: Right knee pain [M25.561] SUBJECTIVE Pain Level (0-10 scale): 6 Any medication changes, allergies to medications, adverse drug reactions, diagnosis change, or new procedure performed?: [x] No    [] Yes (see summary sheet for update) Subjective functional status/changes:   [] No changes reported Pt reports her knee gets \"tingly and numb\" after each PT session for several hours, which \"feels good\", but reports no change in pain otherwise. OBJECTIVE 23 min Therapeutic Exercise:  [x] See flow sheet :  
Rationale: increase strength and improve coordination to improve the patients ability to improve ease of WB activity tolerance. 10 min Neuromuscular Re-education:  [x]  See flow sheet :  
Rationale: increase strength, improve coordination and increase proprioception  to improve the patients ability to improve mechanics to improve ease of daily functional activity. With 
 [] TE 
 [] TA 
 [] neuro 
 [] other: Patient Education: [x] Review HEP [] Progressed/Changed HEP based on:  
[] positioning   [] body mechanics   [] transfers   [] heat/ice application   
[] other:   
 
Other Objective/Functional Measures: normal gait mechanics No limitations noted with interventions with exception of step downs with some associated knee pain, dynamic valgus with poor carryover with cueing Pt reports right back and right knee \"tingling\" but denies pain provocation during interventions Pain Level (0-10 scale) post treatment: 7 ASSESSMENT/Changes in Function: Pt reports no change in pain to date and reports \"tingling and numbness\" in her knee over the last many weeks post treatment. She demonstrates no limitations with regards to gait mechanics, but does report increased pain with step downs and demonstrates associated dynamic knee valgus that does not significantly respond to cueing. Will plan to continue over next few weeks with emphasis on hip strength and control of dynamic valgus tendencies, however, if no change noted, will DC and refer back to ortho. Patient will continue to benefit from skilled PT services to modify and progress therapeutic interventions, address functional mobility deficits, address ROM deficits, address strength deficits, analyze and address soft tissue restrictions, analyze and cue movement patterns and analyze and modify body mechanics/ergonomics to attain remaining goals. []  See Plan of Care 
[]  See progress note/recertification 
[]  See Discharge Summary Progress towards goals / Updated goals: 
Short Term Goals: To be accomplished in 2 weeks: 
             1. Patient will report performance of HEP at least 1 times per day to facilitate improved outcomes and improved self management. 
  
Long Term Goals: To be accomplished in 4 weeks: 
             1. Patient will report FOTO score of 59 or better to indicate significant improvement in functional status.              2. Pt will demonstrate 4/5 hip ABD and 4+/5 ER to improve hip stability during activity and reduce patellofemoral knee pain. 
             3. Pt will demonstrate reciprocal stair negotiation void of impairments to improve ease of functional mobility. PLAN 
[]  Upgrade activities as tolerated     [x]  Continue plan of care 
[]  Update interventions per flow sheet      
[]  Discharge due to:_ 
[]  Other:_ Madai Rincon, PT, DPT, ATC 4/15/2019  9:48 AM 
 
Future Appointments Date Time Provider Christen Valenzuela 4/18/2019  9:40 AM Bereket Vazquez MD [facility]

## 2019-04-17 ENCOUNTER — HOSPITAL ENCOUNTER (OUTPATIENT)
Dept: PHYSICAL THERAPY | Age: 29
Discharge: HOME | End: 2019-04-17
Payer: MEDICAID

## 2019-04-17 PROCEDURE — 97112 NEUROMUSCULAR REEDUCATION: CPT

## 2019-04-17 PROCEDURE — 97110 THERAPEUTIC EXERCISES: CPT

## 2019-04-17 NOTE — PROGRESS NOTES
PT DAILY TREATMENT NOTE 10-18 Patient Name: Shannon Bobo Date:2019 : 1990 [x]  Patient  Verified Payor: [employer]ICAID / Plan: [address] [place] [address] / Product Type: Managed Care Medicaid / In time:3:30  Out time:4:00 Total Treatment Time (min): 30 Visit #: 7 of 8 Medicare/BCBS Only Total Timed Codes (min):  30 1:1 Treatment Time:  30 Treatment Area: Right knee pain [M25.561] SUBJECTIVE Pain Level (0-10 scale): 5/10 Any medication changes, allergies to medications, adverse drug reactions, diagnosis change, or new procedure performed?: [x] No    [] Yes (see summary sheet for update) Subjective functional status/changes:   [] No changes reported Pt reports no new complaints of pain. Pt reports she's only a 5/10 today. OBJECTIVE 20 min Therapeutic Exercise:  [] See flow sheet :  
Rationale: increase ROM and increase strength to improve the patients ability to tolerate ADLs. 10 min Neuromuscular Re-education:  []  See flow sheet :  
Rationale: increase strength, improve coordination and increase proprioception  to improve the patients ability to perform functional activities. With 
 [] TE 
 [] TA 
 [] neuro 
 [] other: Patient Education: [x] Review HEP [] Progressed/Changed HEP based on:  
[] positioning   [] body mechanics   [] transfers   [] heat/ice application   
[] other:   
 
Other Objective/Functional Measures: continued valgus collapse with stairs. Pain Level (0-10 scale) post treatment: 5/10 ASSESSMENT/Changes in Function: Pt continues to demonstrate quad and glute weakness with inability to perform step downs without valgus collapse.   
 
Patient will continue to benefit from skilled PT services to modify and progress therapeutic interventions, address functional mobility deficits, address ROM deficits, address strength deficits, analyze and address soft tissue restrictions, analyze and cue movement patterns and analyze and modify body mechanics/ergonomics to attain remaining goals. []  See Plan of Care 
[]  See progress note/recertification 
[]  See Discharge Summary Progress towards goals / Updated goals: 
Short Term Goals: To be accomplished in 2 weeks: 
             1. Patient will report performance of HEP at least 1 times per day to facilitate improved outcomes and improved self management. - not met per patient report. 4/17/19 
  
Long Term Goals: To be accomplished in 4 weeks: 
             1. Patient will report FOTO score of 59 or better to indicate significant improvement in functional status.              2. Pt will demonstrate 4/5 hip ABD and 4+/5 ER to improve hip stability during activity and reduce patellofemoral knee pain. 
             3. Pt will demonstrate reciprocal stair negotiation void of impairments to improve ease of functional mobility. - Not met; pt continues to have valgus collapse with step downs. 4/17/19 PLAN 
[]  Upgrade activities as tolerated     [x]  Continue plan of care 
[]  Update interventions per flow sheet      
[]  Discharge due to:_ 
[]  Other:_ Latoya Carey PTA 4/17/2019  4:08 PM 
 
Future Appointments Date Time Provider Christen Valenzuela 4/18/2019  9:40 AM [facility]  
4/23/2019  7:00 AM Marlyne Code, PTA [facility]  
4/25/2019  7:30 AM Marlyne Code, PTA [facility]  
4/30/2019  7:30 AM Marlyne Code, PTA [facility]  
5/2/2019  7:30 AM Marlyne Code, PTA [facility]  
5/7/2019  7:30 AM Marlyne Code, PTA [facility]

## 2019-04-18 ENCOUNTER — OFFICE VISIT (OUTPATIENT)
Dept: ORTHOPEDIC SURGERY | Age: 29
End: 2019-04-18

## 2019-04-18 VITALS
HEART RATE: 71 BPM | DIASTOLIC BLOOD PRESSURE: 64 MMHG | BODY MASS INDEX: 23.92 KG/M2 | SYSTOLIC BLOOD PRESSURE: 95 MMHG | OXYGEN SATURATION: 98 % | HEIGHT: 62 IN | TEMPERATURE: 97.4 F | RESPIRATION RATE: 16 BRPM | WEIGHT: 130 LBS

## 2019-04-18 DIAGNOSIS — M25.861 IMPINGEMENT SYNDROME INVOLVING PATELLAR FAT PAD OF RIGHT KNEE: Primary | ICD-10-CM

## 2019-04-18 NOTE — PROGRESS NOTES
1. Have you been to the ER, urgent care clinic since your last visit? Hospitalized since your last visit? No 
 
2. Have you seen or consulted any other health care providers outside of the [address] [place] [address] since your last visit? Include any pap smears or colon screening.  NO
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
## 2019-04-23 ENCOUNTER — HOSPITAL ENCOUNTER (OUTPATIENT)
Dept: PHYSICAL THERAPY | Age: 29
Discharge: HOME | End: 2019-04-23
Payer: MEDICAID

## 2019-04-23 PROCEDURE — 97110 THERAPEUTIC EXERCISES: CPT

## 2019-04-23 PROCEDURE — 97112 NEUROMUSCULAR REEDUCATION: CPT

## 2019-04-23 NOTE — PROGRESS NOTES
PT DAILY TREATMENT NOTE 10-18 Patient Name: Michell Michele Date:2019 : 1990 [x]  Patient  Verified Payor: BLUE CROSS MEDICAID / Plan: [address] [place] [address] / Product Type: Managed Care Medicaid / In time:7:05  Out time:7:41 Total Treatment Time (min): 36 Visit #: 8 of 8 Medicare/BCBS Only Total Timed Codes (min):  36 1:1 Treatment Time:  36  
 
 
Treatment Area: Right knee pain [M25.561] SUBJECTIVE Pain Level (0-10 scale): 6/10 Any medication changes, allergies to medications, adverse drug reactions, diagnosis change, or new procedure performed?: [x] No    [] Yes (see summary sheet for update) Subjective functional status/changes:   [] No changes reported \"I twisted my knee last night when I got up to get my daughter. \" OBJECTIVE 26 min Therapeutic Exercise:  [x] See flow sheet :  
Rationale: increase ROM and increase strength to improve the patients ability to tolerate ADLs. 10 min Neuromuscular Re-education:  [x]  See flow sheet :  
Rationale: increase strength, improve coordination and increase proprioception  to improve the patients ability to perform functional activities with increased ease. With 
 [] TE 
 [] TA 
 [] neuro 
 [] other: Patient Education: [x] Review HEP [] Progressed/Changed HEP based on:  
[] positioning   [] body mechanics   [] transfers   [] heat/ice application   
[] other:   
 
Other Objective/Functional Measures: Added ASLR with pink PB. Right Hip abduction strength 4/5 Right Hip ER strength 4/5 FOTO:38 Pain Level (0-10 scale) post treatment: 4-5/10 ASSESSMENT/Changes in Function: Pt continues to demonstrate right quad weakness when performing step downs. Pt does have increased right hip strength but continues to have anterior right knee pain.   
 
Patient will continue to benefit from skilled PT services to modify and progress therapeutic interventions, address functional mobility deficits, address ROM deficits, address strength deficits and analyze and address soft tissue restrictions to attain remaining goals. []  See Plan of Care [x]  See progress note/recertification 
[]  See Discharge Summary Progress towards goals / Updated goals: 
Short Term Goals: To be accomplished in 2 weeks: 
             1. Patient will report performance of HEP at least 1 times per day to facilitate improved outcomes and improved self management. - Met per patient report. 4/23/19 
  
Long Term Goals: To be accomplished in 4 weeks: 
             1. Patient will report FOTO score of 59 or better to indicate significant improvement in functional status. -Not met 38. 4/23/19 
             2. Pt will demonstrate 4/5 hip ABD and 4+/5 ER to improve hip stability during activity and reduce patellofemoral knee pain. -partially met hip abduction 4/5; hip ER 4/5. 4/23/19 
             3. Pt will demonstrate reciprocal stair negotiation void of impairments to improve ease of functional mobility. - Not met; pt continues to have valgus collapse with step downs. 4/17/19 PLAN 
[]  Upgrade activities as tolerated     [x]  Continue plan of care 
[]  Update interventions per flow sheet      
[]  Discharge due to:_ 
[]  Other:_ Priti Hernandez, PTA 4/23/2019  7:14 AM 
 
Future Appointments Date Time Provider Christen Valenzuela 4/25/2019  7:30 AM Shyann Colby, PTA [facility]  
4/30/2019  7:30 AM Shyann Colby, PTA [facility]  
5/2/2019  7:30 AM Shyann Colby, PTA [facility]  
5/7/2019  7:30 AM Shyann Colby, PTA [facility]  
6/18/2019  8:00 AM Stevo German MD [facility] 69

## 2019-04-23 NOTE — PROGRESS NOTES
In [address] [place], [address] 
[phone] fax Physical Therapy Progress Note Patient name: Mike Ding Start of Care: 3/22/2019 Referral source: Bereket Vazquez,* : 1990 Medical/Treatment Diagnosis: Right knee pain [M25.561] Payor: BLUE CROSS MEDICAID / Plan: [address] [place] [address] / Product Type: Managed Care Medicaid /  Onset Date:2017 Prior Hospitalization: see medical history Provider#: [id number] Medications: Verified on Patient Summary List   
 Comorbidities: , otherwise unremarkable per pt report Prior Level of Function: gym, walking, cycling for exercise, caring for daughter, occasional knee pains bilat but did not limit function Visits from Start of Care: 8    Missed Visits: 2 Established Goals:        Excellent         Good         Limited            None 
[] Increased ROM   []  []  []  [] 
[] Increased Strength  []  []  []  [] 
[] Increased Mobility  []  []  []  []  
[] Decreased Pain   []  []  []  [] 
[] Decreased Swelling  []  []  []  [] 
 
Key Functional Changes: Right Hip abduction strength 4/5 Right Hip ER strength 4/5 
  
FOTO:38 
continued valgus collapse with stairs negotiation Updated Goals: to be achieved in 3 weeks: 
Short Term Goals: To be accomplished in 2 weeks: 
             1. Patient will report performance of HEP at least 1 times per day to facilitate improved outcomes and improved self management. - Met per patient report. 19 
  
Long Term Goals: To be accomplished in 4 weeks: 
             1. Patient will report FOTO score of 59 or better to indicate significant improvement in functional status. -Not met 38. 19 
             2. Pt will demonstrate 4/5 hip ABD and 4+/5 ER to improve hip stability during activity and reduce patellofemoral knee pain. -partially met hip abduction 4/5; hip ER 4/5. 19              3. Pt will demonstrate reciprocal stair negotiation void of impairments to improve ease of functional mobility. - Not met; pt continues to have valgus collapse with step downs. 4/17/19 ASSESSMENT/RECOMMENDATIONS: Pt demonstrates minimal improvement to date. She continues to report anterior knee pain with associated impairments consisting of hip weakness, eccentric quadriceps weakness, and dynamic valgus instability with stair negotiation. She demonstrates minimal to no hamstring flexibility limitations. She appears to be minimally responsive to cueing with interventions to date. Will plan to continue PT with emphasis on dynamic knee valgus stability and LE strength, however, if not improving, will advise referral for further assessment with ortho. []Continue therapy per initial plan/protocol at a frequency of  2 x per week for 2-3 weeks []Continue therapy with the following recommended changes:_____________________      _____________________________________________________________________ []Discontinue therapy progressing towards or have reached established goals []Discontinue therapy due to lack of appreciable progress towards goals []Discontinue therapy due to lack of attendance or compliance []Await Physician's recommendations/decisions regarding therapy []Other:________________________________________________________________ Thank you for this referral.   
Daily Perkins, PT, DPT, ATC 4/23/2019 2:27 PM 
NOTE TO PHYSICIAN:  PLEASE COMPLETE THE ORDERS BELOW AND  
FAX TO [facility] Physical Therapy: [phone] If you are unable to process this request in 24 hours please contact our office: [phone] ? I have read the above report and request that my patient continue as recommended. ? I have read the above report and request that my patient continue therapy with the following changes/special instructions:__________________________________________________________ ? I have read the above report and request that my patient be discharged from therapy. Physicians signature: ______________________________Date: ______Time:______

## 2019-04-25 ENCOUNTER — HOSPITAL ENCOUNTER (OUTPATIENT)
Dept: PHYSICAL THERAPY | Age: 29
Discharge: HOME | End: 2019-04-25
Payer: MEDICAID

## 2019-04-25 PROCEDURE — 97112 NEUROMUSCULAR REEDUCATION: CPT

## 2019-04-25 PROCEDURE — 97110 THERAPEUTIC EXERCISES: CPT

## 2019-04-25 NOTE — PROGRESS NOTES
PT DAILY TREATMENT NOTE 10-18 Patient Name: Mike Ding Date:2019 : 1990 [x]  Patient  Verified Payor: BLUE CROSS MEDICAID / Plan: [address] [place] [address] / Product Type: Managed Care Medicaid / In [place] [id number] Total Treatment Time (min):33 Visit #: 1 of 4-6 Medicare/BCBS Only Total Timed Codes (min):  33 1:1 Treatment Time:  24  
 
 
Treatment Area: Right knee pain [M25.561] SUBJECTIVE Pain Level (0-10 scale): 4/10 Any medication changes, allergies to medications, adverse drug reactions, diagnosis change, or new procedure performed?: [x] No    [] Yes (see summary sheet for update) Subjective functional status/changes:   [] No changes reported Pt reports improvement with PT. OBJECTIVE 26 min Therapeutic Exercise:  [x] See flow sheet :  
Rationale: increase ROM and increase strength to improve the patients ability to tolerate ADLs. 10 min Neuromuscular Re-education:  [x]  See flow sheet :  
Rationale: increase strength, improve coordination and increase proprioception  to improve the patients ability to perform functional activities with increased ease. With 
 [] TE 
 [] TA 
 [] neuro 
 [] other: Patient Education: [x] Review HEP [] Progressed/Changed HEP based on:  
[] positioning   [] body mechanics   [] transfers   [] heat/ice application   
[] other:   
 
Other Objective/Functional Measures: increased reps per flow sheet Pain Level (0-10 scale) post treatment: 5/10 ASSESSMENT/Changes in Function:  Pt tolerated treatment well, minor pain increase following. Patient will continue to benefit from skilled PT services to modify and progress therapeutic interventions, address functional mobility deficits, address ROM deficits, address strength deficits and analyze and address soft tissue restrictions to attain remaining goals. []  See Plan of Care [x]  See progress note/recertification 
[]  See Discharge Summary Progress towards goals / Updated goals: 
Short Term Goals: To be accomplished in 2 weeks: 
             1. Patient will report performance of HEP at least 1 times per day to facilitate improved outcomes and improved self management. - Met per patient report. 4/23/19 
  
Long Term Goals: To be accomplished in 4 weeks: 
             1. Patient will report FOTO score of 59 or better to indicate significant improvement in functional status. -Not met 38. 4/23/19 
             2. Pt will demonstrate 4/5 hip ABD and 4+/5 ER to improve hip stability during activity and reduce patellofemoral knee pain. -partially met hip abduction 4/5; hip ER 4/5. 4/23/19 
             3. Pt will demonstrate reciprocal stair negotiation void of impairments to improve ease of functional mobility. - Not met; pt continues to have valgus collapse with step downs. 4/17/19 PLAN 
[]  Upgrade activities as tolerated     [x]  Continue plan of care 
[]  Update interventions per flow sheet      
[]  Discharge due to:_ 
[]  Other:_ Zari Amezcua, PT 4/25/2019  7:14 AM 
 
Future Appointments Date Time Provider Christen Valenzuela 4/30/2019  7:30 AM [facility]  
5/2/2019  7:30 AM Demond West PTA [facility]  
5/7/2019  7:30 AM Demond West PTA [facility]  
6/18/2019  8:00 AM Tania Henry MD [facility] 69

## 2019-04-30 ENCOUNTER — HOSPITAL ENCOUNTER (OUTPATIENT)
Dept: PHYSICAL THERAPY | Age: 29
Discharge: HOME | End: 2019-04-30
Payer: MEDICAID

## 2019-04-30 PROCEDURE — 97112 NEUROMUSCULAR REEDUCATION: CPT

## 2019-04-30 PROCEDURE — 97110 THERAPEUTIC EXERCISES: CPT

## 2019-04-30 NOTE — PROGRESS NOTES
PT DAILY TREATMENT NOTE 10-18 Patient Name: Hansel Collins Date:2019 : 1990 [x]  Patient  Verified Payor: BLUE CROSS MEDICAID / Plan: [address] [place] [address] / Product Type: Managed Care Medicaid / In time:7:34  Out time:8:05 Total Treatment Time (min): 31 Visit #: 2 of 4-6 Medicare/BCBS Only Total Timed Codes (min):  31 1:1 Treatment Time:  31  
 
 
Treatment Area: Right knee pain [M25.561] SUBJECTIVE Pain Level (0-10 scale): 4/10 Any medication changes, allergies to medications, adverse drug reactions, diagnosis change, or new procedure performed?: [x] No    [] Yes (see summary sheet for update) Subjective functional status/changes:   [] No changes reported Pt reports no new complaints of pain. Pt reports  Compliance with HEP> OBJECTIVE 21 min Therapeutic Exercise:  [x] See flow sheet :  
Rationale: increase ROM and increase strength to improve the patients ability to tolerate ADLs 10 min Neuromuscular Re-education:  [x]  See flow sheet :  
Rationale: increase strength and improve coordination  to improve the patients ability to improve functional mobility with increased ease. With 
 [] TE 
 [] TA 
 [] neuro 
 [] other: Patient Education: [x] Review HEP [] Progressed/Changed HEP based on:  
[] positioning   [] body mechanics   [] transfers   [] heat/ice application   
[] other:   
 
Other Objective/Functional Measures: Pt demonstrates continue poor form with squats requiring vc's and tc's. Pain Level (0-10 scale) post treatment: 0/10 ASSESSMENT/Changes in Function: Pt demonstrates continued LE weakness and poor proprioception with inability to correct squat form without cues.   
 
Patient will continue to benefit from skilled PT services to modify and progress therapeutic interventions, address functional mobility deficits, address ROM deficits, address strength deficits, analyze and address soft tissue restrictions and analyze and cue movement patterns to attain remaining goals. []  See Plan of Care 
[]  See progress note/recertification 
[]  See Discharge Summary Progress towards goals / Updated goals: 
Short Term Goals: To be accomplished in 2 weeks: 
             1. Patient will report performance of HEP at least 1 times per day to facilitate improved outcomes and improved self management. - Met per patient report. 4/23/19 
  
Long Term Goals: To be accomplished in 4 weeks: 
             1. Patient will report FOTO score of 59 or better to indicate significant improvement in functional status. -Not met 38. 4/23/19 
             2. Pt will demonstrate 4/5 hip ABD and 4+/5 ER to improve hip stability during activity and reduce patellofemoral knee pain. -partially met hip abduction 4/5; hip ER 4/5. 4/23/19 
             3. Pt will demonstrate reciprocal stair negotiation void of impairments to improve ease of functional mobility. - Not met; pt continues to have valgus collapse with step downs. 4/17/19 PLAN 
[]  Upgrade activities as tolerated     [x]  Continue plan of care 
[]  Update interventions per flow sheet      
[]  Discharge due to:_ 
[]  Other:_ Rodriguez Youssef, PTA 4/30/2019  7:42 AM 
 
Future Appointments Date Time Provider Christen Valenzuela 5/2/2019  7:30 AM Demond West PTA [facility]  
5/7/2019  7:30 AM Demond West PTA [facility]  
6/18/2019  8:00 AM Tania Henry MD [facility] 69

## 2019-05-02 ENCOUNTER — HOSPITAL ENCOUNTER (OUTPATIENT)
Dept: PHYSICAL THERAPY | Age: 29
Discharge: HOME | End: 2019-05-02
Payer: MEDICAID

## 2019-05-02 PROCEDURE — 97112 NEUROMUSCULAR REEDUCATION: CPT

## 2019-05-02 PROCEDURE — 97110 THERAPEUTIC EXERCISES: CPT

## 2019-05-02 NOTE — PROGRESS NOTES
PT DAILY TREATMENT NOTE 10-18 Patient Name: April Severino Date:2019 : 1990 [x]  Patient  Verified Payor: BLUE CROSS MEDICAID / Plan: [address] [place] [address] / Product Type: Managed Care Medicaid / In time:7:34  Out time:8:10 Total Treatment Time (min): 36 Visit #: 3 of 4-6 Medicare/BCBS Only Total Timed Codes (min):  36 1:1 Treatment Time:  30 Treatment Area: Right knee pain [M25.561] SUBJECTIVE Pain Level (0-10 scale): 4/10 Any medication changes, allergies to medications, adverse drug reactions, diagnosis change, or new procedure performed?: [x] No    [] Yes (see summary sheet for update) Subjective functional status/changes:   [] No changes reported Pt reports no new complaints of pain. Pt reports compliance with HEP. OBJECTIVE 20 min Therapeutic Exercise:  [] See flow sheet :  
Rationale: increase ROM and increase strength to improve the patients ability to tolerate ADLs. 16 min Neuromuscular Re-education:  []  See flow sheet :  
Rationale: increase ROM and increase strength  to improve the patients ability to tolerate ADLs. With 
 [] TE 
 [] TA 
 [] neuro 
 [] other: Patient Education: [x] Review HEP [] Progressed/Changed HEP based on:  
[] positioning   [] body mechanics   [] transfers   [] heat/ice application   
[] other:   
 
Other Objective/Functional Measures: added TRX pistol squat. Pain Level (0-10 scale) post treatment: 3/10 ASSESSMENT/Changes in Function: Pt continues to require constant vc's to correct form with squats due to continued poor form. Patient will continue to benefit from skilled PT services to modify and progress therapeutic interventions, address functional mobility deficits, address ROM deficits, address strength deficits, analyze and address soft tissue restrictions, analyze and cue movement patterns and analyze and modify body mechanics/ergonomics to attain remaining goals. []  See Plan of Care 
[]  See progress note/recertification 
[]  See Discharge Summary Progress towards goals / Updated goals: 
Short Term Goals: To be accomplished in 2 weeks: 
             1. Patient will report performance of HEP at least 1 times per day to facilitate improved outcomes and improved self management. - Met per patient report. 4/23/19 
  
Long Term Goals: To be accomplished in 4 weeks: 
             1. Patient will report FOTO score of 59 or better to indicate significant improvement in functional status. -Not met 38. 4/23/19 
             2. Pt will demonstrate 4/5 hip ABD and 4+/5 ER to improve hip stability during activity and reduce patellofemoral knee pain. -partially met hip abduction 4/5; hip ER 4/5. 4/23/19 
             3. Pt will demonstrate reciprocal stair negotiation void of impairments to improve ease of functional mobility. - Not met; pt continues to have valgus collapse with step downs. 4/17/19 PLAN 
[]  Upgrade activities as tolerated     [x]  Continue plan of care 
[]  Update interventions per flow sheet      
[]  Discharge due to:_ 
[]  Other:_ Imtiaz De Paz PTA 5/2/2019  7:39 AM 
 
Future Appointments Date Time Provider Christen Valenzuela 5/7/2019  7:30 AM Emelyn Duke PTA [facility]  
6/18/2019  8:00 AM [facility]

## 2019-05-07 ENCOUNTER — HOSPITAL ENCOUNTER (OUTPATIENT)
Dept: PHYSICAL THERAPY | Age: 29
Discharge: HOME | End: 2019-05-07
Payer: MEDICAID

## 2019-05-07 PROCEDURE — 97110 THERAPEUTIC EXERCISES: CPT

## 2019-05-07 PROCEDURE — 97112 NEUROMUSCULAR REEDUCATION: CPT

## 2019-05-07 NOTE — PROGRESS NOTES
PT DAILY TREATMENT NOTE 10-18 Patient Name: Joan Short Date:2019 : 1990 [x]  Patient  Verified Payor: BLUE CROSS MEDICAID / Plan: [address] [place] [address] / Product Type: Managed Care Medicaid / In time:7:33  Out time:7:59 Total Treatment Time (min): 26 Visit #: 4 of 4-6 Medicare/BCBS Only Total Timed Codes (min):  26 1:1 Treatment Time:    
 
 
Treatment Area: Right knee pain [M25.561] SUBJECTIVE Pain Level (0-10 scale): 4/10 Any medication changes, allergies to medications, adverse drug reactions, diagnosis change, or new procedure performed?: [x] No    [] Yes (see summary sheet for update) Subjective functional status/changes:   [] No changes reported Pt reports no new complaints of pain. Pt reports compliance with HEP. OBJECTIVE 16 min Therapeutic Exercise:  [x] See flow sheet :  
Rationale: increase ROM and increase strength to improve the patients ability to tolerate ADLs. 10 min Neuromuscular Re-education:  []  See flow sheet :  
Rationale: increase strength, improve coordination and increase proprioception  to improve the patients ability to perform functional activities with increased ease. With 
 [] TE 
 [] TA 
 [] neuro 
 [] other: Patient Education: [x] Review HEP [] Progressed/Changed HEP based on:  
[] positioning   [] body mechanics   [] transfers   [] heat/ice application   
[] other:   
 
Other Objective/Functional Measures: continues vc's to correct squat form. Pain Level (0-10 scale) post treatment: 4/10 ASSESSMENT/Changes in Function: Pt continues to demonstrate poor to fair proprioception with inability to self correct squat form.   
 
Patient will continue to benefit from skilled PT services to modify and progress therapeutic interventions, address functional mobility deficits, address ROM deficits, address strength deficits, analyze and address soft tissue restrictions, analyze and cue movement patterns and analyze and modify body mechanics/ergonomics to attain remaining goals. []  See Plan of Care 
[]  See progress note/recertification 
[]  See Discharge Summary Progress towards goals / Updated goals: 
Short Term Goals: To be accomplished in 2 weeks: 
             1. Patient will report performance of HEP at least 1 times per day to facilitate improved outcomes and improved self management. - Met per patient report. 4/23/19 
  
Long Term Goals: To be accomplished in 4 weeks: 
             1. Patient will report FOTO score of 59 or better to indicate significant improvement in functional status. -Not met 38. 4/23/19 
             2. Pt will demonstrate 4/5 hip ABD and 4+/5 ER to improve hip stability during activity and reduce patellofemoral knee pain. -partially met hip abduction 4/5; hip ER 4/5. 4/23/19 
             3. Pt will demonstrate reciprocal stair negotiation void of impairments to improve ease of functional mobility. - Not met; pt continues to have valgus collapse with step downs. 4/17/19 PLAN 
[]  Upgrade activities as tolerated     [x]  Continue plan of care 
[]  Update interventions per flow sheet      
[]  Discharge due to:_ 
[]  Other:_ Eulah Bending, PTA 5/7/2019  7:52 AM 
 
Future Appointments Date Time Provider Christen Valenzuela 6/18/2019  8:00 AM Tino Ching MD [id number] [facility]

## 2019-05-14 ENCOUNTER — APPOINTMENT (OUTPATIENT)
Dept: PHYSICAL THERAPY | Age: 29
End: 2019-05-14
Payer: MEDICAID

## 2019-05-16 ENCOUNTER — HOSPITAL ENCOUNTER (OUTPATIENT)
Dept: PHYSICAL THERAPY | Age: 29
Discharge: HOME | End: 2019-05-16
Payer: MEDICAID

## 2019-05-16 PROCEDURE — 97112 NEUROMUSCULAR REEDUCATION: CPT

## 2019-05-16 PROCEDURE — 97110 THERAPEUTIC EXERCISES: CPT

## 2019-05-16 NOTE — PROGRESS NOTES
PT DAILY TR EATMENT NOTE 10-18 Patient Name: Jillian Anderson Date:2019 : 1990 [x]  Patient  Verified Payor: [employer]ICA[employer] / Plan: [address] [place] [address] / Product Type: Managed Care Medicaid / In time:7:30  Out time:8:00 Total Treatment Time (min): 30 Visit #: 5 of 4-6 Medicare/BCBS Only Total Timed Codes (min):  30 1:1 Treatment Time:  30 Treatment Area: Right knee pain [M25.561] SUBJECTIVE Pain Level (0-10 scale): 4/10 Any medication changes, allergies to medications, adverse drug reactions, diagnosis change, or new procedure performed?: [x] No    [] Yes (see summary sheet for update) Subjective functional status/changes:   [] No changes reported Pt reports no new complaints of pain. OBJECTIVE 20 min Therapeutic Exercise:  [] See flow sheet :  
Rationale: increase ROM and increase strength to improve the patients ability to tolerate ADLs. 10 min Neuromuscular Re-education:  []  See flow sheet :  
Rationale: increase strength, improve coordination and increase proprioception  to improve the patients ability to improve tolerance to functional activities. With 
 [] TE 
 [] TA 
 [] neuro 
 [] other: Patient Education: [x] Review HEP [] Progressed/Changed HEP based on:  
[] positioning   [] body mechanics   [] transfers   [] heat/ice application   
[] other:   
 
Other Objective/Functional Measures: continued valgus collapse with step downs. Pain Level (0-10 scale) post treatment: 4/10 ASSESSMENT/Changes in Function: Pt continues to demonstrate good form with all exercises but continues to demonstrate glute weakness with continued valgus collapse.   
 
Patient will continue to benefit from skilled PT services to modify and progress therapeutic interventions, address functional mobility deficits, address ROM deficits, address strength deficits, analyze and address soft tissue restrictions, analyze and cue movement patterns and analyze and modify body mechanics/ergonomics to attain remaining goals. []  See Plan of Care 
[]  See progress note/recertification 
[]  See Discharge Summary Progress towards goals / Updated goals: 
Short Term Goals: To be accomplished in 2 weeks: 
             1. Patient will report performance of HEP at least 1 times per day to facilitate improved outcomes and improved self management. - Met per patient report. 4/23/19 
  
Long Term Goals: To be accomplished in 4 weeks: 
             1. Patient will report FOTO score of 59 or better to indicate significant improvement in functional status. -Not met 38. 4/23/19 
             2. Pt will demonstrate 4/5 hip ABD and 4+/5 ER to improve hip stability during activity and reduce patellofemoral knee pain. -partially met hip abduction 4/5; hip ER 4/5. 4/23/19 
             3. Pt will demonstrate reciprocal stair negotiation void of impairments to improve ease of functional mobility. - Not met; continued valgus collapse. 5/16/19 PLAN 
[]  Upgrade activities as tolerated     [x]  Continue plan of care 
[]  Update interventions per flow sheet      
[]  Discharge due to:_ 
[]  Other:_ Zachary Reid PTA 5/16/2019  7:35 AM 
 
Future Appointments Date Time Provider Christen Edmondsi 5/21/2019  7:30 AM Mary Blake PTA [id number]  
5/28/2019  9:40 AM Maricruz Pendleton MD [id number] [facility]

## 2019-05-21 ENCOUNTER — HOSPITAL ENCOUNTER (OUTPATIENT)
Dept: PHYSICAL THERAPY | Age: 29
Discharge: HOME | End: 2019-05-21
Payer: MEDICAID

## 2019-05-21 PROCEDURE — 97110 THERAPEUTIC EXERCISES: CPT

## 2019-05-21 PROCEDURE — 97112 NEUROMUSCULAR REEDUCATION: CPT

## 2019-05-21 NOTE — PROGRESS NOTES
PT DAILY TREATMENT NOTE 10-18    Patient Name: Cheryl Goodrich  Date:2019  : 1990  [x]  Patient  Verified  Payor: [employer]ICA[employer] / Plan: [facility] [employer] [facility] / Product Type: Managed Care Medicaid /    In time:7:34  Out time:8:01  Total Treatment Time (min): 27  Visit #: 6 of 4-6    Medicare/BCBS Only   Total Timed Codes (min):  27 1:1 Treatment Time:         Treatment Area: Right knee pain [M25.561]    SUBJECTIVE  Pain Level (0-10 scale): 3/10  Any medication changes, allergies to medications, adverse drug reactions, diagnosis change, or new procedure performed?: [x] No    [] Yes (see summary sheet for update)  Subjective functional status/changes:   [] No changes reported  Pt reports no significant change in symptoms. Pt reports compliance with HEP. OBJECTIVE    15 min Therapeutic Exercise:  [] See flow sheet :   Rationale: increase ROM and increase strength to improve the patients ability to tolerate ADLs. 12 min Neuromuscular Re-education:  []  See flow sheet :   Rationale: increase strength, improve coordination and increase proprioception  to improve the patients ability to perform functional activities with increased ease. With   [] TE   [] TA   [] neuro   [] other: Patient Education: [x] Review HEP    [] Progressed/Changed HEP based on:   [] positioning   [] body mechanics   [] transfers   [] heat/ice application    [] other:      Other Objective/Functional Measures: FOTO:60     Pain Level (0-10 scale) post treatment: 3/10    ASSESSMENT/Changes in Function: Pt demonstrates continued genu valgus with unilateral stability an strength activities and continued glute med weakness. Discontinuing PT at this time due to lack of progress toward objective goals.       []  See Plan of Care  []  See progress note/recertification  []  See Discharge Summary         Progress towards goals / Updated goals:  Short Term Goals: To be accomplished in 2 weeks:               1. Patient will report performance of HEP at least 1 times per day to facilitate improved outcomes and improved self management. - Met per patient report. 4/23/19     Long Term Goals: To be accomplished in 4 weeks:               1. Patient will report FOTO score of 59 or better to indicate significant improvement in functional status. -Met FOTO score 60. 5/21/19               2. Pt will demonstrate 4/5 hip ABD and 4+/5 ER to improve hip stability during activity and reduce patellofemoral knee pain. -partially met hip abduction 4/5; hip ER 4/5. 4/23/19; No change 5/21/19               3. Pt will demonstrate reciprocal stair negotiation void of impairments to improve ease of functional mobility. - Not met; continued valgus collapse. 5/16/19; No change.  5/21/19        PLAN  []  Upgrade activities as tolerated     []  Continue plan of care  []  Update interventions per flow sheet       [x]  Discharge due to:_  []  Other:_      Zachary Reid PTA 5/21/2019  7:41 AM    Future Appointments   Date Time Provider Christen Valenzuela   5/28/2019  9:40 AM Maricruz Pendleton MD [facility] [address]

## 2019-05-28 ENCOUNTER — OFFICE VISIT (OUTPATIENT)
Dept: ORTHOPEDIC SURGERY | Age: 29
End: 2019-05-28

## 2019-05-28 VITALS
HEIGHT: 62 IN | HEART RATE: 79 BPM | DIASTOLIC BLOOD PRESSURE: 62 MMHG | WEIGHT: 124.6 LBS | TEMPERATURE: 98.2 F | SYSTOLIC BLOOD PRESSURE: 95 MMHG | OXYGEN SATURATION: 99 % | BODY MASS INDEX: 22.93 KG/M2

## 2019-05-28 DIAGNOSIS — M25.861 IMPINGEMENT SYNDROME INVOLVING PATELLAR FAT PAD OF RIGHT KNEE: Primary | ICD-10-CM

## 2019-05-28 RX ORDER — BUPROPION HYDROCHLORIDE 150 MG/1
TABLET ORAL
COMMUNITY
End: 2019-08-12

## 2019-05-28 NOTE — PROGRESS NOTES
Evaluated patient for hamstring flexibility per Dr. Eder Duncan request, as she is still experiencing anterior knee pain (3/10). Patient's SLR is >90 degrees. Patient states she has hyperflexibility in all of her joints. Hamstring tightness does not seem to be a contributing factor to her knee pain. Spoke with patient about PT exercises and ADLs. Patient states PT has focused almost exclusively on hip strengthening to correct valgus knee and femoral anteversion. PT has been successful in reducing pain from 7/10 to 3/10, but pain has not completely resolved. I educated patient in cause of pain, likely from the extended period of time her patella was subluxed and the wear on the cartilage on her patella and lateral femoral condyle. I also reviewed proper knee position with her - whenever WB flexing and extending of the knee, consciously ensure knees stay in alignment with feet to avoid valgus posture and patellar maltracking. I told her to ask her PT for other WB knee motor control exerices in addition to the strengthening PT and HEP she has been doing. Patient understands and consents to treatment plan and was very grateful for the extra time and education.
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
## 2019-06-04 NOTE — PROGRESS NOTES
[facility]  [address]  [place], [address]  [phone] fax    Physical Therapy Discharge Summary  Patient name: Michell Michele Start of Care: 3/22/2019   Referral source: Kenneth Carter,* : 1990   Medical/Treatment Diagnosis: Right knee pain [M25.561]  Payor: [employer]OSS MEDICAID / Plan: [facility] [employer] / Product Type: Managed Care Medicaid /  Onset Date:2017     Prior Hospitalization: see medical history Provider#: [id number]   Medications: Verified on Patient Summary List     Comorbidities: , otherwise unremarkable per pt report   Prior Level of Function: gym, walking, cycling for exercise, caring for daughter, occasional knee pains bilat but did not limit function                Visits from Start of Care: 14    Missed Visits: 2  Reporting Period : 3/22/2019 to 2019    Summary of Care:  Progress towards goals / Updated goals:  Short Term Goals: To be accomplished in 2 weeks:               1. Patient will report performance of HEP at least 1 times per day to facilitate improved outcomes and improved self management. - Met per patient report. 19     Long Term Goals: To be accomplished in 4 weeks:               1. Patient will report FOTO score of 59 or better to indicate significant improvement in functional status. -Met FOTO score 60. 19               2. Pt will demonstrate 4/5 hip ABD and 4+/5 ER to improve hip stability during activity and reduce patellofemoral knee pain. -partially met hip abduction 4/5; hip ER 4/5. 19; No change 19               3. Pt will demonstrate reciprocal stair negotiation void of impairments to improve ease of functional mobility. - Not met; continued valgus collapse. 19; No change. 19    ASSESSMENT/RECOMMENDATIONS: Pt demonstrates continued genu valgus with unilateral stability an strength activities and continued glute med weakness. Discontinuing PT at this time due to lack of progress toward objective goals, though she has reported some decline in pain since start of care.      [x]Discontinue therapy: []Patient has reached or is progressing toward set goals      []Patient is non-compliant or has abdicated      [x]Due to lack of appreciable progress towards set goals    Olga Moses, PT, DPT, ATC 6/4/2019 10:48 AM

## 2019-07-16 ENCOUNTER — OFFICE VISIT (OUTPATIENT)
Dept: ORTHOPEDIC SURGERY | Age: 29
End: 2019-07-16

## 2019-07-16 VITALS
OXYGEN SATURATION: 98 % | BODY MASS INDEX: 23 KG/M2 | SYSTOLIC BLOOD PRESSURE: 100 MMHG | WEIGHT: 125 LBS | HEIGHT: 62 IN | DIASTOLIC BLOOD PRESSURE: 93 MMHG | TEMPERATURE: 98.8 F | HEART RATE: 59 BPM

## 2019-07-16 DIAGNOSIS — M25.861 IMPINGEMENT SYNDROME INVOLVING PATELLAR FAT PAD OF RIGHT KNEE: Primary | ICD-10-CM

## 2019-07-16 DIAGNOSIS — E65 FAT PAD SYNDROME: ICD-10-CM

## 2019-07-16 NOTE — PROGRESS NOTES
I have individually seen and examined Kelle Dance in addition to the physician assistant. Assessment:    ICD-10-CM ICD-9-CM    1. Impingement syndrome involving patellar fat pad of right knee M25.861 719.86 AMB POC XRAY, KNEE; 1/2 VIEWS   2. Fat pad syndrome E65 278.1         IMAGING: XR with sunrise view dated 7/16/19 was reviewed and read: lateral tilting of patella. Plan:  1. The patient presents today with right knee pain due to MRI documented fat pad syndrome. I discussed the risks and benefits and potential adverse outcomes of both operative vs non operative treatment of right fat pad syndrome with the patient. Patient wishes to proceed with right lateral retinacular release with chondroplasty of patella. Risks of operative intervention include but not limited to bleeding, infection, deep vein thrombosis, pulmonary embolism, death, limb length discrepancy, reflexive sympathetic dystrophy, fat embolism syndrome,damage to blood vessels and nerves, malunion, non-union, delayed union, failure of hardware, post traumatic arthritis, stroke, heart attack, and death. Patient understands that infection may arise and may require numerous surgeries. History and physical exam scheduled for a later date. Risk factors include: n/a  2. No ultrasound exam indicated today  3. No cortisone injection indicated today   4. No Physical Therapy indicated today  5. No diagnostic test indicated today:   6. No durable medical equipment indicated today  7. No referral indicated today   8. No medications indicated today:   9. No Narcotic indicated today       RTC H&P    Scribed by Amanda Coley [address]) as dictated by Forrest Bourne MD     I, Dr. Forrest Bourne, confirm that all documentation is accurate.      Forrest Bourne M.D.   [facility]
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
## 2019-08-12 ENCOUNTER — HOSPITAL ENCOUNTER (EMERGENCY)
Age: 29
Discharge: HOME | End: 2019-08-12
Attending: EMERGENCY MEDICINE
Payer: MEDICAID

## 2019-08-12 ENCOUNTER — APPOINTMENT (OUTPATIENT)
Dept: ULTRASOUND IMAGING | Age: 29
End: 2019-08-12
Attending: EMERGENCY MEDICINE
Payer: MEDICAID

## 2019-08-12 VITALS
HEART RATE: 91 BPM | SYSTOLIC BLOOD PRESSURE: 116 MMHG | DIASTOLIC BLOOD PRESSURE: 68 MMHG | OXYGEN SATURATION: 100 % | RESPIRATION RATE: 13 BRPM

## 2019-08-12 DIAGNOSIS — O21.9 NAUSEA AND VOMITING DURING PREGNANCY: Primary | ICD-10-CM

## 2019-08-12 DIAGNOSIS — Z32.01 PREGNANCY TEST PERFORMED, PREGNANCY CONFIRMED: ICD-10-CM

## 2019-08-12 LAB
ALBUMIN SERPL-MCNC: 4.4 G/DL (ref 3.4–5)
ALBUMIN/GLOB SERPL: 1.1 {RATIO} (ref 0.8–1.7)
ALP SERPL-CCNC: 72 U/L (ref 45–117)
ALT SERPL-CCNC: 17 U/L (ref 13–56)
ANION GAP SERPL CALC-SCNC: 11 MMOL/L (ref 3–18)
APPEARANCE UR: CLEAR
AST SERPL-CCNC: 14 U/L (ref 10–38)
BASOPHILS # BLD: 0 K/UL (ref 0–0.1)
BASOPHILS NFR BLD: 0 % (ref 0–2)
BILIRUB SERPL-MCNC: 0.6 MG/DL (ref 0.2–1)
BILIRUB UR QL: NEGATIVE
BUN SERPL-MCNC: 6 MG/DL (ref 7–18)
BUN/CREAT SERPL: 9 (ref 12–20)
CALCIUM SERPL-MCNC: 9.7 MG/DL (ref 8.5–10.1)
CHLORIDE SERPL-SCNC: 103 MMOL/L (ref 100–111)
CO2 SERPL-SCNC: 23 MMOL/L (ref 21–32)
COLOR UR: YELLOW
CREAT SERPL-MCNC: 0.7 MG/DL (ref 0.6–1.3)
DIFFERENTIAL METHOD BLD: ABNORMAL
EOSINOPHIL # BLD: 0.1 K/UL (ref 0–0.4)
EOSINOPHIL NFR BLD: 1 % (ref 0–5)
ERYTHROCYTE [DISTWIDTH] IN BLOOD BY AUTOMATED COUNT: 13 % (ref 11.6–14.5)
GLOBULIN SER CALC-MCNC: 3.9 G/DL (ref 2–4)
GLUCOSE SERPL-MCNC: 95 MG/DL (ref 74–99)
GLUCOSE UR STRIP.AUTO-MCNC: NEGATIVE MG/DL
HCG SERPL-ACNC: ABNORMAL MIU/ML (ref 0–10)
HCG UR QL: POSITIVE
HCT VFR BLD AUTO: 42.2 % (ref 35–45)
HGB BLD-MCNC: 14.8 G/DL (ref 12–16)
HGB UR QL STRIP: NEGATIVE
KETONES UR QL STRIP.AUTO: 40 MG/DL
LEUKOCYTE ESTERASE UR QL STRIP.AUTO: NEGATIVE
LIPASE SERPL-CCNC: 76 U/L (ref 73–393)
LYMPHOCYTES # BLD: 1.7 K/UL (ref 0.9–3.6)
LYMPHOCYTES NFR BLD: 14 % (ref 21–52)
MCH RBC QN AUTO: 30 PG (ref 24–34)
MCHC RBC AUTO-ENTMCNC: 35.1 G/DL (ref 31–37)
MCV RBC AUTO: 85.4 FL (ref 74–97)
MONOCYTES # BLD: 0.7 K/UL (ref 0.05–1.2)
MONOCYTES NFR BLD: 6 % (ref 3–10)
NEUTS SEG # BLD: 9.7 K/UL (ref 1.8–8)
NEUTS SEG NFR BLD: 79 % (ref 40–73)
NITRITE UR QL STRIP.AUTO: NEGATIVE
PH UR STRIP: 7.5 [PH] (ref 5–8)
PLATELET # BLD AUTO: 289 K/UL (ref 135–420)
PMV BLD AUTO: 9.3 FL (ref 9.2–11.8)
POTASSIUM SERPL-SCNC: 3.2 MMOL/L (ref 3.5–5.5)
PROT SERPL-MCNC: 8.3 G/DL (ref 6.4–8.2)
PROT UR STRIP-MCNC: NEGATIVE MG/DL
RBC # BLD AUTO: 4.94 M/UL (ref 4.2–5.3)
SODIUM SERPL-SCNC: 137 MMOL/L (ref 136–145)
SP GR UR REFRACTOMETRY: 1.01 (ref 1–1.03)
UROBILINOGEN UR QL STRIP.AUTO: 0.2 EU/DL (ref 0.2–1)
WBC # BLD AUTO: 12.2 K/UL (ref 4.6–13.2)

## 2019-08-12 PROCEDURE — 81003 URINALYSIS AUTO W/O SCOPE: CPT

## 2019-08-12 PROCEDURE — 81025 URINE PREGNANCY TEST: CPT

## 2019-08-12 PROCEDURE — 96375 TX/PRO/DX INJ NEW DRUG ADDON: CPT

## 2019-08-12 PROCEDURE — C9113 INJ PANTOPRAZOLE SODIUM, VIA: HCPCS | Performed by: EMERGENCY MEDICINE

## 2019-08-12 PROCEDURE — 76817 TRANSVAGINAL US OBSTETRIC: CPT

## 2019-08-12 PROCEDURE — 84702 CHORIONIC GONADOTROPIN TEST: CPT

## 2019-08-12 PROCEDURE — 83690 ASSAY OF LIPASE: CPT

## 2019-08-12 PROCEDURE — 85025 COMPLETE CBC W/AUTO DIFF WBC: CPT

## 2019-08-12 PROCEDURE — 80053 COMPREHEN METABOLIC PANEL: CPT

## 2019-08-12 PROCEDURE — 96374 THER/PROPH/DIAG INJ IV PUSH: CPT

## 2019-08-12 PROCEDURE — 74011250636 HC RX REV CODE- 250/636: Performed by: EMERGENCY MEDICINE

## 2019-08-12 PROCEDURE — 96361 HYDRATE IV INFUSION ADD-ON: CPT

## 2019-08-12 PROCEDURE — 99284 EMERGENCY DEPT VISIT MOD MDM: CPT

## 2019-08-12 RX ORDER — ONDANSETRON 2 MG/ML
4 INJECTION INTRAMUSCULAR; INTRAVENOUS
Status: COMPLETED | OUTPATIENT
Start: 2019-08-12 | End: 2019-08-12

## 2019-08-12 RX ORDER — PANTOPRAZOLE SODIUM 40 MG/10ML
40 INJECTION, POWDER, LYOPHILIZED, FOR SOLUTION INTRAVENOUS
Status: COMPLETED | OUTPATIENT
Start: 2019-08-12 | End: 2019-08-12

## 2019-08-12 RX ORDER — DOXYLAMINE SUCCINATE AND PYRIDOXINE HYDROCHLORIDE, DELAYED RELEASE TABLETS 10 MG/10 MG 10; 10 MG/1; MG/1
1 TABLET, DELAYED RELEASE ORAL
Qty: 12 TAB | Refills: 0 | Status: SHIPPED | OUTPATIENT
Start: 2019-08-12

## 2019-08-12 RX ADMIN — ONDANSETRON 4 MG: 2 INJECTION INTRAMUSCULAR; INTRAVENOUS at 04:57

## 2019-08-12 RX ADMIN — SODIUM CHLORIDE 1000 ML: 900 INJECTION, SOLUTION INTRAVENOUS at 04:57

## 2019-08-12 RX ADMIN — PANTOPRAZOLE SODIUM 40 MG: 40 INJECTION, POWDER, LYOPHILIZED, FOR SOLUTION INTRAVENOUS at 04:57

## 2019-08-12 NOTE — ED NOTES
Turned over to me by Dr. Miguel Gold ED provider. Patient is a 61-year-old pregnant woman. Presents with nausea vomiting. Has ultrasound pending. U/S showed an intrauterine pregnancy 6 weeks with no acute process. I have reassessed the patient. Patient is feeling well. Patient was prescribed Diglegis. Patient was discharged in stable condition. Patient is to return to emergency department if any new or worsening condition.

## 2019-08-12 NOTE — DISCHARGE INSTRUCTIONS
Patient Education        Managing Morning Sickness: Care Instructions  Your Care Instructions    For many women, the toughest part of early pregnancy is morning sickness. Morning sickness can range from mild nausea to severe nausea with bouts of vomiting. Symptoms may be worse in the morning, although they can strike at any time of the day or night. If you have nausea, vomiting, or both, look for safe measures that can bring you relief. You can take simple steps at home to manage morning sickness. These steps include changing what and when you eat and avoiding certain foods and smells. Some women find that acupuncture and acupressure wristbands also help. Follow-up care is a key part of your treatment and safety. Be sure to make and go to all appointments, and call your doctor if you are having problems. It's also a good idea to know your test results and keep a list of the medicines you take. How can you care for yourself at home? · Keep food in your stomach, but not too much at once. Your nausea may be worse if your stomach is empty. Eat five or six small meals a day instead of three large meals. · For morning nausea, eat a small snack, such as a couple of crackers or dry biscuits, before rising. Allow a few minutes for your stomach to settle before you get out of bed slowly. · Drink plenty of fluids, enough so that your urine is light yellow or clear like water. If you have kidney, heart, or liver disease and have to limit fluids, talk with your doctor before you increase the amount of fluids you drink. Some women find that peppermint tea helps with nausea. · Eat more protein, such as chicken, fish, lean meat, beans, nuts, and seeds. · Eat carbohydrate foods, such as potatoes, whole-grain cereals, rice, and pasta. · Avoid smells and foods that make you feel nauseated. Spicy or high-fat foods, citrus juice, milk, coffee, and tea with caffeine often make nausea worse. · Do not drink alcohol.   · Do not smoke. Try not to be around others who smoke. If you need help quitting, talk to your doctor about stop-smoking programs and medicines. These can increase your chances of quitting for good. · If you are taking iron supplements, ask your doctor if they are necessary. Iron can make nausea worse. · Get lots of rest. Stress and fatigue can make your morning sickness worse. · Ask your doctor about taking prescription medicine, or over-the-counter products such as vitamin B6, doxylamine, or yuki, to relieve your symptoms. Your doctor can tell you the doses that are safe for you. · Take your prenatal vitamins at night on a full stomach. When should you call for help? Call 911 anytime you think you may need emergency care. For example, call if:    · You passed out (lost consciousness).    Call your doctor now or seek immediate medical care if:    · You are sick to your stomach or cannot drink fluids.     · You have symptoms of dehydration, such as:  ? Dry eyes and a dry mouth. ? Passing only a little urine. ? Feeling thirstier than usual.     · You are not able to keep down your medicine.     · You have pain in your belly or pelvis.    Watch closely for changes in your health, and be sure to contact your doctor if:    · You do not get better as expected. Where can you learn more? Go to http://aziza-kasie.info/. Enter S467 in the search box to learn more about \"Managing Morning Sickness: Care Instructions. \"  Current as of: September 5, 2018  Content Version: 12.1  © 5307-8396 Healthwise, Incorporated. Care instructions adapted under license by [employer] (which disclaims liability or warranty for this information). If you have questions about a medical condition or this instruction, always ask your healthcare professional. [place] [address] any warranty or liability for your use of this information.

## 2019-08-12 NOTE — ED PROVIDER NOTES
Emergency Department Treatment Report    Patient: Luis Enrique Grimm Age: 30 y.o. Sex: female    YOB: 1990 Admit Date: 2019 PCP: Tashia Gomez MD   MRN: [id number]  CSN: [id number]     Room: [facility] Time Dictated: 4:26 AM      Chief Complaint   Epigastric abdominal pain    History of Present Illness   30 y.o. female presents with 1 week of intermittent epigastric pain, nausea and vomiting. She recently had some dysuria starting earlier today. She is under increased stress because her dad is admitted to the ICU. She says her LMP was 4 weeks ago. She denies hematemesis, fevers, flank pain, rash, headache, diarrhea or constipation. Review of Systems   Constitutional: No fever, chills, or weight loss  Eyes: No visual symptoms. ENT: No sore throat, runny nose or ear pain. Respiratory: No cough, dyspnea or wheezing. Cardiovascular: No chest pain, pressure, palpitations, tightness or heaviness. Gastrointestinal: +epigastric abdominal pain, nausea, vomiting  Genitourinary: No dysuria, frequency, or urgency. Musculoskeletal: No joint pain or swelling. Integumentary: No rashes. Neurological: No headaches, sensory or motor symptoms. Denies complaints in all other systems.     Past Medical/Surgical History     Past Medical History:   Diagnosis Date    Hypermobile joints     arms, hip, upper back    Scoliosis      Past Surgical History:   Procedure Laterality Date    HX  SECTION      HX OTHER SURGICAL             Social History     Social History     Socioeconomic History    Marital status: LEGALLY      Spouse name: Not on file    Number of children: Not on file    Years of education: Not on file    Highest education level: Not on file   Tobacco Use    Smoking status: Former Smoker     Types: Cigarettes     Last attempt to quit: 3/18/2019     Years since quittin.4    Smokeless tobacco: Never Used   Substance and Sexual Activity    Alcohol use: No    Drug use: No    Sexual activity: Yes     Partners: Male     Birth control/protection: None       Family History     Family History   Family history unknown: Yes       Home Medications     Prior to Admission Medications   Prescriptions Last Dose Informant Patient Reported? Taking? buPROPion XL (WELLBUTRIN XL) 150 mg tablet   Yes No   Sig: bupropion HCl  mg 24 hr tablet, extended release   TAKE 1 TABLET BY MOUTH EVERY DAY FOR 7 DAYS   erythromycin (ILOTYCIN) ophthalmic ointment   No No   Sig: Administer 3.5 g to left eye every six (6) hours. norethindrone-ethinyl estradiol (ORTHO-NOVUM 1-35 TAB, NORTREL 1-35 TAB) 1-35 mg-mcg tab   Yes No   Sig: Take 1 Tab by mouth daily. ondansetron hcl (ZOFRAN, AS HYDROCHLORIDE,) 4 mg tablet   No No   Sig: Take 2 Tabs by mouth every eight (8) hours as needed for Nausea. oxyCODONE-acetaminophen (PERCOCET) 5-325 mg per tablet   No No   Sig: Take 1 tablet every 4-6 hours as needed for pain control. If you were instructed to try over the counter ibuprofen or tylenol, only take the percocet for pain not controlled with the over the counter medication. Facility-Administered Medications: None       Allergies     Allergies   Allergen Reactions    Latex Itching    Petrolatum [Mineral Oil-Hydrophil Petrolat] Anaphylaxis and Rash    Codeine Nausea and Vomiting    Tramadol Nausea and Vomiting       Physical Exam     ED Triage Vitals   ED Encounter Vitals Group      BP       Pulse       Resp       Temp       Temp src       SpO2       Weight       Height      Constitutional: Patient appears well developed and well nourished. Appearance and behavior are age and situation appropriate. HEENT: Conjunctiva clear. PERRLA. Mucous membranes moist, non-erythematous. Surface of the pharynx, palate, and tongue are pink, moist and without lesions. Neck: supple, non tender, symmetrical, no masses or JVD. Respiratory: lungs clear to auscultation, nonlabored respirations.  No tachypnea or accessory muscle use. Cardiovascular: heart regular rate and rhythm without murmur rubs or gallops. Calves soft and non-tender. Distal pulses 2+ and equal bilaterally. No peripheral edema. Gastrointestinal:  Abdomen soft, mild epigastric tenderness and mild suprapubic tenderness  Musculoskeletal: Nail beds pink with prompt capillary refill  Integumentary: warm and dry without rashes or lesions  Neurologic: alert and oriented, Sensation intact, motor strength equal and symmetric. No facial asymmetry or dysarthria. Diagnostic Studies   Lab:   Recent Results (from the past 12 hour(s))   CBC WITH AUTOMATED DIFF    Collection Time: 08/12/19  4:14 AM   Result Value Ref Range    WBC 12.2 4.6 - 13.2 K/uL    RBC 4.94 4.20 - 5.30 M/uL    HGB 14.8 12.0 - 16.0 g/dL    HCT 42.2 35.0 - 45.0 %    MCV 85.4 74.0 - 97.0 FL    MCH 30.0 24.0 - 34.0 PG    MCHC 35.1 31.0 - 37.0 g/dL    RDW 13.0 11.6 - 14.5 %    PLATELET 580 124 - 286 K/uL    MPV 9.3 9.2 - 11.8 FL    NEUTROPHILS 79 (H) 40 - 73 %    LYMPHOCYTES 14 (L) 21 - 52 %    MONOCYTES 6 3 - 10 %    EOSINOPHILS 1 0 - 5 %    BASOPHILS 0 0 - 2 %    ABS. NEUTROPHILS 9.7 (H) 1.8 - 8.0 K/UL    ABS. LYMPHOCYTES 1.7 0.9 - 3.6 K/UL    ABS. MONOCYTES 0.7 0.05 - 1.2 K/UL    ABS. EOSINOPHILS 0.1 0.0 - 0.4 K/UL    ABS. BASOPHILS 0.0 0.0 - 0.1 K/UL    DF AUTOMATED     METABOLIC PANEL, COMPREHENSIVE    Collection Time: 08/12/19  4:14 AM   Result Value Ref Range    Sodium 137 136 - 145 mmol/L    Potassium 3.2 (L) 3.5 - 5.5 mmol/L    Chloride 103 100 - 111 mmol/L    CO2 23 21 - 32 mmol/L    Anion gap 11 3.0 - 18 mmol/L    Glucose 95 74 - 99 mg/dL    BUN 6 (L) 7.0 - 18 MG/DL    Creatinine 0.70 0.6 - 1.3 MG/DL    BUN/Creatinine ratio 9 (L) 12 - 20      GFR est AA >60 >60 ml/min/1.73m2    GFR est non-AA >60 >60 ml/min/1.73m2    Calcium 9.7 8.5 - 10.1 MG/DL    Bilirubin, total 0.6 0.2 - 1.0 MG/DL    ALT (SGPT) 17 13 - 56 U/L    AST (SGOT) 14 10 - 38 U/L    Alk.  phosphatase 72 45 - 117 U/L    Protein, total 8.3 (H) 6.4 - 8.2 g/dL    Albumin 4.4 3.4 - 5.0 g/dL    Globulin 3.9 2.0 - 4.0 g/dL    A-G Ratio 1.1 0.8 - 1.7     LIPASE    Collection Time: 08/12/19  4:14 AM   Result Value Ref Range    Lipase 76 73 - 393 U/L   BETA HCG, QT    Collection Time: 08/12/19  4:14 AM   Result Value Ref Range    Beta HCG, QT 37,015 (H) 0 - 10 MIU/ML   URINALYSIS W/ RFLX MICROSCOPIC    Collection Time: 08/12/19  4:46 AM   Result Value Ref Range    Color YELLOW      Appearance CLEAR      Specific gravity 1.006 1.005 - 1.030      pH (UA) 7.5 5.0 - 8.0      Protein NEGATIVE  NEG mg/dL    Glucose NEGATIVE  NEG mg/dL    Ketone 40 (A) NEG mg/dL    Bilirubin NEGATIVE  NEG      Blood NEGATIVE  NEG      Urobilinogen 0.2 0.2 - 1.0 EU/dL    Nitrites NEGATIVE  NEG      Leukocyte Esterase NEGATIVE  NEG     HCG URINE, QL    Collection Time: 08/12/19  4:46 AM   Result Value Ref Range    HCG urine, QL POSITIVE (A) NEG         Imaging:    No results found. Breanne.Night    ED Course/Medical Decision Making   Patient appears well and is in no acute distress. He is having some increasing nausea and vomiting, but has not had any nausea or vomiting while in the ER. Her pregnancy test is positive and her LMP was 4 weeks ago. She does have some mild suprapubic pain, so ultrasound ordered to look for an ectopic pregnancy. Medications   pantoprazole (PROTONIX) injection 40 mg (40 mg IntraVENous Given 8/12/19 0457)   ondansetron (ZOFRAN) injection 4 mg (4 mg IntraVENous Given 8/12/19 0457)   sodium chloride 0.9 % bolus infusion 1,000 mL (0 mL IntraVENous IV Completed 8/12/19 0627)       Final Diagnosis       ICD-10-CM ICD-9-CM   1. Nausea and vomiting during pregnancy O21.9 643.90   2. Pregnancy test performed, pregnancy confirmed Z32.01 V72.42       Disposition   6:30 AM: Pt care transferred to Dr. Frandy Julien, ED provider.  History of patient complaint(s), available diagnostic reports and current treatment plan has been discussed thoroughly. Bedside rounding on patient occurred: Yes  Intended disposition of patient: Likely Home  Pending diagnostics reports and/or labs (please list): TV US       My Medications      START taking these medications      Instructions Each Dose to Equal Morning Noon Evening Bedtime   doxylamine-pyridoxine (vit B6) 10-10 mg Tbec DR tablet  Commonly known as:  DICLEGIS    Your last dose was: Your next dose is: Take 1 Tab by mouth three (3) times daily as needed for Nausea. 1 Tab                    STOP taking these medications    buPROPion  mg tablet  Commonly known as:  WELLBUTRIN XL        erythromycin ophthalmic ointment  Commonly known as:  ILOTYCIN        norethindrone-ethinyl estradiol 1-35 mg-mcg Tab  Commonly known as:  ORTHO-NOVUM 1-35 TAB, NORTREL 1-35 TAB        ondansetron hcl 4 mg tablet  Commonly known as:  ZOFRAN        oxyCODONE-acetaminophen 5-325 mg per tablet  Commonly known as:  PERCOCET              Where to Get Your Medications      Information on where to get these meds will be given to you by the nurse or doctor. Ask your nurse or doctor about these medications  · doxylamine-pyridoxine (vit B6) 10-10 mg Tbec DR tablet           Jose Antonio Valentino MD  August 12, 2019    My signature above authenticates this document and my orders, the final    diagnosis (es), discharge prescription (s), and instructions in the Epic    record. If you have any questions please contact [phone]. Nursing notes have been reviewed by the physician/ advanced practice    Clinician. Disclaimer: Sections of this note are dictated using utilizing voice recognition software. Minor typographical errors may be present. If questions arise, please do not hesitate to contact me or call our department.

## 2019-08-12 NOTE — ED NOTES
Bedside, Verbal and Written shift change report given to [facility] (oncoming nurse) by Liana MENDOZA(offgoing nurse). Report included the following information SBAR, Kardex, and MAR. Hourly rounding and  chart checks completed.

## 2019-08-30 ENCOUNTER — TELEPHONE (OUTPATIENT)
Dept: ORTHOPEDIC SURGERY | Age: 29
End: 2019-08-30

## 2019-08-30 NOTE — TELEPHONE ENCOUNTER
Please give to Fern Hamm regarding why she was not called. Recommend waiting until after baby is born for surgery. She needs to stretch hamstrings real well while pregnant. If she wants we can revisit physical therapy. No medications since pregnant.

## 2019-08-30 NOTE — TELEPHONE ENCOUNTER
Patient called stating that she was supposed to be set up with knee surgery a few months ago but she never received a call. She said she is pregnant now and was wondering what she needed to do. She figured she would have to wait until after the baby is born for surgery but she was wondering if there is something she can do in the mean time.  Please advise patient at [phone]

## 2019-09-03 NOTE — TELEPHONE ENCOUNTER
Spoke with patient and informed her of Amrik Baisn. Patient would like to schedule appointment to discuss therapy options. Call was transferred to the [facility] to schedule appt.

## 2019-09-10 ENCOUNTER — OFFICE VISIT (OUTPATIENT)
Dept: ORTHOPEDIC SURGERY | Age: 29
End: 2019-09-10

## 2019-09-10 VITALS
TEMPERATURE: 97.7 F | WEIGHT: 121.2 LBS | OXYGEN SATURATION: 97 % | HEART RATE: 78 BPM | RESPIRATION RATE: 13 BRPM | BODY MASS INDEX: 22.31 KG/M2 | HEIGHT: 62 IN | SYSTOLIC BLOOD PRESSURE: 98 MMHG | DIASTOLIC BLOOD PRESSURE: 61 MMHG

## 2019-09-10 DIAGNOSIS — E65 FAT PAD SYNDROME: Primary | ICD-10-CM

## 2019-09-10 DIAGNOSIS — M25.861 IMPINGEMENT SYNDROME INVOLVING PATELLAR FAT PAD OF RIGHT KNEE: ICD-10-CM
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
## 2019-09-10 NOTE — PROGRESS NOTES
1. Have you been to the ER, urgent care clinic since your last visit? Hospitalized since your last visit? No    2. Have you seen or consulted any other health care providers outside of the [address] [place] [address] since your last visit? Include any pap smears or colon screening.  No

## 2019-09-13 ENCOUNTER — HOSPITAL ENCOUNTER (OUTPATIENT)
Dept: PHYSICAL THERAPY | Age: 29
Discharge: HOME | End: 2019-09-13
Payer: MEDICAID

## 2019-09-13 PROCEDURE — 97162 PT EVAL MOD COMPLEX 30 MIN: CPT

## 2019-09-13 PROCEDURE — 97110 THERAPEUTIC EXERCISES: CPT
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
## 2019-09-13 NOTE — PROGRESS NOTES
PT DAILY TREATMENT NOTE 10-18    Patient Name: Nunu Cancer  Date:2019  : 1990  [x]  Patient  Verified  Payor: BLUE CROSS MEDICAID / Plan: [facility] [employer] / Product Type: Managed Care Medicaid /    In time:1110  Out time:1142  Total Treatment Time (min): 42  Visit #: 1 of 8    Medicare/BCBS Only   Total Timed Codes (min):  23 1:1 Treatment Time:  42       Treatment Area: Pain in right knee [M25.561]    SUBJECTIVE  Pain Level (0-10 scale): 3-4  Any medication changes, allergies to medications, adverse drug reactions, diagnosis change, or new procedure performed?: [x] No    [] Yes (see summary sheet for update)  Subjective functional status/changes:   [] No changes reported  See eval    OBJECTIVE         19 min [x]Eval                  []Re-Eval       23 min Therapeutic Exercise:  [] See flow sheet :   Rationale: increase strength to improve the patients ability to perform daily activities          With   [] TE   [] TA   [] neuro   [] other: Patient Education: [x] Review HEP    [] Progressed/Changed HEP based on:   [] positioning   [] body mechanics   [] transfers   [] heat/ice application    [] other:      Other Objective/Functional Measures:      Pain Level (0-10 scale) post treatment: 3-4    ASSESSMENT/Changes in Function: see POC    Patient will continue to benefit from skilled PT services to modify and progress therapeutic interventions, address functional mobility deficits, address strength deficits, analyze and address soft tissue restrictions and analyze and cue movement patterns to attain remaining goals. [x]  See Plan of Care  []  See progress note/recertification  []  See Discharge Summary         Progress towards goals / Updated goals:  Short Term Goals: To be accomplished in 2 weeks:  1. I and compliant with HEP for self management of symptoms.    2.Perform echo clams x4 without rest break to indicate improved glute med strength for stairs.      Long Term Goals: To be accomplished in 4 weeks:               1. Improve FOTO to 65 to indicate improved function with daily activities. 2. Increase right hip strength to grossly 4+/5 to improve stability for daily activities. 3. Demonstrate (-) 90/90 hamstring to indicate improve mobility for stairs.      PLAN  []  Upgrade activities as tolerated     [x]  Continue plan of care  []  Update interventions per flow sheet       []  Discharge due to:_  []  Other:_      Jing Queen, PT 9/13/2019  12:00 PM    Future Appointments   Date Time Provider Christen Valenzuela   9/16/2019  1:30 PM Kalie [facility], PTA [facility]   9/18/2019 10:30 AM Harvinder Ashley, PTA [facility]   9/23/2019  2:00 PM Harvinder Ashley, PTA [facility]   9/25/2019 11:00 AM Harvinder Ashley, PTA [facility]   9/30/2019 10:30 AM Harvinder Ashley, PTA [facility]   10/4/2019 10:30 AM Andree Florian, PT [facility]   10/7/2019 10:30 AM Harvinder Ashley, PTA [facility]

## 2019-09-16 ENCOUNTER — HOSPITAL ENCOUNTER (OUTPATIENT)
Dept: PHYSICAL THERAPY | Age: 29
Discharge: HOME | End: 2019-09-16
Payer: MEDICAID

## 2019-09-16 PROCEDURE — 97140 MANUAL THERAPY 1/> REGIONS: CPT

## 2019-09-16 PROCEDURE — 97110 THERAPEUTIC EXERCISES: CPT

## 2019-09-16 NOTE — PROGRESS NOTES
PT DAILY TREATMENT NOTE 10-18    Patient Name: Jarvis Posada  Date:2019  : 1990  [x]  Patient  Verified  Payor: BLUE CROSS MEDICAID / Plan: [facility] [employer] / Product Type: Managed Care Medicaid /    In time:1:30  Out time:2:04  Total Treatment Time (min): 34  Visit #: 2 of 8    Medicare/BCBS Only   Total Timed Codes (min):  34 1:1 Treatment Time:  26       Treatment Area: Pain in right knee [M25.561]    SUBJECTIVE  Pain Level (0-10 scale): 2  Any medication changes, allergies to medications, adverse drug reactions, diagnosis change, or new procedure performed?: [x] No    [] Yes (see summary sheet for update)  Subjective functional status/changes:   [] No changes reported  Pt reports feeling a little better today, the back of her knee is a little swollen but doesn't hurt as much    OBJECTIVE    26 min Therapeutic Exercise:  [x] See flow sheet :   Rationale: increase strength and increase proprioception to improve the patients ability to perform ADLs    8 min Manual Therapy:  STM/ stick to right IT band   Rationale: decrease pain, increase ROM and increase tissue extensibility to improve functional mobility            With   [] TE   [] TA   [] neuro   [] other: Patient Education: [x] Review HEP    [] Progressed/Changed HEP based on:   [] positioning   [] body mechanics   [] transfers   [] heat/ice application    [] other:      Other Objective/Functional Measures:   First f/u after Eval     Pain Level (0-10 scale) post treatment: 2    ASSESSMENT/Changes in Function: Initiated treatment program per flow sheet. Reviewed HEP for understanding and compliance. Pt was challenged well with therex denies incr'd pain but notes post knee swelling at end of treatment. Issued orange tband for HEP. Cont progressing as able.      Patient will continue to benefit from skilled PT services to modify and progress therapeutic interventions, address functional mobility deficits, address ROM deficits, address strength deficits, analyze and address soft tissue restrictions, analyze and cue movement patterns and analyze and modify body mechanics/ergonomics to attain remaining goals. []  See Plan of Care  []  See progress note/recertification  []  See Discharge Summary         Progress towards goals / Updated goals:     Short Term Goals: To be accomplished in 2 weeks:  1. I and compliant with HEP for self management of symptoms. 2.Perform echo clams x4 without rest break to indicate improved glute med strength for stairs.      Long Term Goals: To be accomplished in 4 weeks:               1. Improve FOTO to 65 to indicate improved function with daily activities. 2. Increase right hip strength to grossly 4+/5 to improve stability for daily activities. 3. Demonstrate (-) 90/90 hamstring to indicate improve mobility for stairs.      PLAN  []  Upgrade activities as tolerated     [x]  Continue plan of care  []  Update interventions per flow sheet       []  Discharge due to:_  []  Other:_      Pavan Hackett PTA 9/16/2019  1:35 PM    Future Appointments   Date Time Provider Christen Valenzuela   9/18/2019 10:30 AM Lili Myers PTA [facility]   9/23/2019  2:00 PM Lili Myers PTA [facility]   9/25/2019 11:00 AM Lili Myers PTA [facility]   9/30/2019 10:30 AM Lili Myers PTA [facility]   10/4/2019 10:30 AM Edilma Herrera, GABBY [facility]   10/7/2019 10:30 AM Lili Myers PTA [facility]

## 2019-09-18 ENCOUNTER — HOSPITAL ENCOUNTER (OUTPATIENT)
Dept: PHYSICAL THERAPY | Age: 29
Discharge: HOME | End: 2019-09-18
Payer: MEDICAID

## 2019-09-18 PROCEDURE — 97112 NEUROMUSCULAR REEDUCATION: CPT

## 2019-09-18 PROCEDURE — 97140 MANUAL THERAPY 1/> REGIONS: CPT

## 2019-09-18 PROCEDURE — 97110 THERAPEUTIC EXERCISES: CPT

## 2019-09-18 NOTE — PROGRESS NOTES
PT DAILY TREATMENT NOTE 10-18    Patient Name: Juan A Lopez  Date:2019  : 1990  [x]  Patient  Verified  Payor: BLUE CROSS MEDICAID / Plan: [facility] [employer] HEALTHKEEPERS PLUS / Product Type: Managed Care Medicaid /    In time:10:35  Out time:11:13  Total Treatment Time (min): 38  Visit #: 3 of 8    Medicare/BCBS Only   Total Timed Codes (min):  38 1:1 Treatment Time:  38       Treatment Area: Pain in right knee [M25.561]    SUBJECTIVE  Pain Level (0-10 scale): 10  Any medication changes, allergies to medications, adverse drug reactions, diagnosis change, or new procedure performed?: [x] No    [] Yes (see summary sheet for update)  Subjective functional status/changes:   [] No changes reported  \"The knee is doing okay right now, not having a good pregnancy morning. \"    OBJECTIVE    22 min Therapeutic Exercise:  [x] See flow sheet :   Rationale: increase ROM and increase strength to improve the patients ability to perform ADL's.     8 min Neuromuscular Re-education:  [x]  See flow sheet :   Rationale: increase strength and increase proprioception  to improve the patients ability to perform functional activities. 8 min Manual Therapy:  Graston technique to Right ITB. Rationale: decrease pain, increase ROM, increase tissue extensibility and decrease trigger points to improve ease of performing functional activities. With   [x] TE   [] TA   [] neuro   [] other: Patient Education: [x] Review HEP    [] Progressed/Changed HEP based on:   [] positioning   [] body mechanics   [] transfers   [] heat/ice application    [] other:      Other Objective/Functional Measures: Added dynamic step ups and eccentric step downs. Added RDL's with stick 10 reps. Changed hip 3-way and squats to being performed on shuttle balance.      Pain Level (0-10 scale) post treatment: 0/10    ASSESSMENT/Changes in Function: Tolerated exercises well, denied pain provocation throughout, expressed LE fatigue post-treatment. Continue PT to increase LE strength and decrease ITB restrictions to improve ease of performing functional tasks/activities. Patient will continue to benefit from skilled PT services to modify and progress therapeutic interventions, address functional mobility deficits, address ROM deficits, address strength deficits, analyze and address soft tissue restrictions and analyze and modify body mechanics/ergonomics to attain remaining goals. [x]  See Plan of Care  []  See progress note/recertification  []  See Discharge Summary         Progress towards goals / Updated goals:  Short Term Goals: To be accomplished in 2 weeks:  1. I and compliant with HEP for self management of symptoms. - Pt reports HEP compliance. 9/18/2019  2. Perform echo clams x4 without rest break to indicate improved glute med strength for stairs.      Long Term Goals: To be accomplished in 4 weeks:               1. Improve FOTO to 65 to indicate improved function with daily activities. 2. Increase right hip strength to grossly 4+/5 to improve stability for daily activities.   3. Demonstrate (-) 90/90 hamstring to indicate improve mobility for stairs.     PLAN  []  Upgrade activities as tolerated     [x]  Continue plan of care  []  Update interventions per flow sheet       []  Discharge due to:_  []  Other:_      Vivien Severino PTA 9/18/2019  10:38 AM    Future Appointments   Date Time Provider Christen Valenzuela   9/23/2019  2:00 PM [facility]   9/25[facility]2019 11:00 AM [facility]   9/30/2019 10:30 AM [facility]   10/4/2019 10:30 [facility]   10/7/2019 10:30 AM Renetta Ford PTA [facility]

## 2019-09-23 ENCOUNTER — HOSPITAL ENCOUNTER (OUTPATIENT)
Dept: PHYSICAL THERAPY | Age: 29
Discharge: HOME | End: 2019-09-23
Payer: MEDICAID

## 2019-09-23 PROCEDURE — 97112 NEUROMUSCULAR REEDUCATION: CPT

## 2019-09-23 PROCEDURE — 97140 MANUAL THERAPY 1/> REGIONS: CPT

## 2019-09-23 NOTE — PROGRESS NOTES
PT DAILY TREATMENT NOTE 10-18    Patient Name: Toy Amend  Date:2019  : 1990  [x]  Patient  Verified  Payor: BLUE CROSS MEDICAID / Plan: [facility] HEALTHKEEPERS PLUS / Product Type: Managed Care Medicaid /    In time:2:06  Out time:2:33  Total Treatment Time (min): 27  Visit #: 4 of 8    Medicare/BCBS Only   Total Timed Codes (min):  27 1:1 Treatment Time:  27       Treatment Area: Pain in right knee [M25.561]    SUBJECTIVE  Pain Level (0-10 scale): 210  Any medication changes, allergies to medications, adverse drug reactions, diagnosis change, or new procedure performed?: [x] No    [] Yes (see summary sheet for update)  Subjective functional status/changes:   [] No changes reported  \"Not too bad right now. I need to leave by 2:35 today. \"  Pt 6' late for appointment. OBJECTIVE    11 min Therapeutic Exercise:  [x] See flow sheet :   Rationale: increase ROM and increase strength to improve the patients ability to perform ADL's.    8 min Neuromuscular Re-education:  [x]  See flow sheet :   Rationale: increase strength and increase proprioception  to improve the patients ability to perform functional activities. 8 min Manual Therapy:  Stick/Graston technique to Right ITB and piriformis. Rationale: decrease pain, increase tissue extensibility and decrease trigger points to improve ease of performing functional tasks. With   [x] TE   [] TA   [] neuro   [] other: Patient Education: [x] Review HEP    [] Progressed/Changed HEP based on:   [] positioning   [] body mechanics   [] transfers   [] heat/ice application    [] other:      Other Objective/Functional Measures: Increased clamshells to 12 reps each. Pain Level (0-10 scale) post treatment: 2/10    ASSESSMENT/Changes in Function: Able to perform cornerford clamshell series without breaks, expressed mm soreness and right knee feels tight from exercises.  Continue PT to strengthen LE's to improve ease of performing functional tasks with decreased pain/soreness. Patient will continue to benefit from skilled PT services to modify and progress therapeutic interventions, address functional mobility deficits, address ROM deficits, address strength deficits, analyze and address soft tissue restrictions and analyze and modify body mechanics/ergonomics to attain remaining goals. [x]  See Plan of Care  []  See progress note/recertification  []  See Discharge Summary         Progress towards goals / Updated goals:  Short Term Goals: To be accomplished in 2 weeks:  1. I and compliant with HEP for self management of symptoms. - Pt reports HEP compliance. 9/18/2019  2. Perform echo clams x4 without rest break to indicate improved glute med strength for stairs. - Met, able to perform cornerford clamshell series without breaks, expressed mm soreness. 9/23/2019     Long Term Goals: To be accomplished in 4 weeks:               1. Improve FOTO to 65 to indicate improved function with daily activities. 2. Increase right hip strength to grossly 4+/5 to improve stability for daily activities.   3. Demonstrate (-) 90/90 hamstring to indicate improve mobility for stairs.     PLAN  []  Upgrade activities as tolerated     [x]  Continue plan of care  []  Update interventions per flow sheet       []  Discharge due to:_  []  Other:_      Jocelyn Andrews, COURT 9/23/2019  2:12 PM    Future Appointments   Date Time Provider Christen Valenzuela   9/25/2019 11:00 AM Charles Garrett PTA [facility]   9/30/2019 10:30 AM Charles Garrett PTA [facility]   10/4/2019 10:30 AM Daisy Best, GABBY [facility]   10/7/2019 10:30 AM Charles Garrett PTA [facility]

## 2019-09-25 ENCOUNTER — APPOINTMENT (OUTPATIENT)
Dept: PHYSICAL THERAPY | Age: 29
End: 2019-09-25
Payer: MEDICAID

## 2019-09-26 ENCOUNTER — HOSPITAL ENCOUNTER (OUTPATIENT)
Dept: PHYSICAL THERAPY | Age: 29
Discharge: HOME | End: 2019-09-26
Payer: MEDICAID

## 2019-09-26 PROCEDURE — 97110 THERAPEUTIC EXERCISES: CPT

## 2019-09-26 PROCEDURE — 97140 MANUAL THERAPY 1/> REGIONS: CPT

## 2019-09-26 NOTE — PROGRESS NOTES
PT DAILY TREATMENT NOTE 10-18    Patient Name: Shashank Pak  Date:2019  : 1990  [x]  Patient  Verified  Payor: BLUE CROSS MEDICAID / Plan: [facility] [employer] HEALTHKEEPERS PLUS / Product Type: Managed Care Medicaid /    In time:11:04  Out time:11:30  Total Treatment Time (min):   Visit #: 5 of 8    Medicare/BCBS Only   Total Timed Codes (min):  26 1:1 Treatment Time:         Treatment Area: Pain in right knee [M25.561]    SUBJECTIVE  Pain Level (0-10 scale): 1/10  Any medication changes, allergies to medications, adverse drug reactions, diagnosis change, or new procedure performed?: [x] No    [] Yes (see summary sheet for update)  Subjective functional status/changes:   [] No changes reported  Pt reports her pain is better since starting PT.     OBJECTIVE    15 min Therapeutic Exercise:  [x] See flow sheet :   Rationale: increase ROM and increase strength to improve the patients ability to tolerate ADLs. 10 min Neuromuscular Re-education:  [x]  See flow sheet :   Rationale: increase strength, improve coordination and increase proprioception  to improve the patients ability to perform functional activities with increased ease. 8 min Manual Therapy:  Stick to right ITB   Rationale: decrease pain, increase ROM and increase tissue extensibility to improve tolerance to functional activities. With   [] TE   [] TA   [] neuro   [] other: Patient Education: [x] Review HEP    [] Progressed/Changed HEP based on:   [] positioning   [] body mechanics   [] transfers   [] heat/ice application    [] other:      Other Objective/Functional Measures: minimal increase in pain     Pain Level (0-10 scale) post treatment: 1/10    ASSESSMENT/Changes in Function: Pt has no adverse reaction to treatment. Pt has fair control with weight bearing exercises.      Patient will continue to benefit from skilled PT services to modify and progress therapeutic interventions, address functional mobility deficits, address ROM deficits, address strength deficits, analyze and address soft tissue restrictions and analyze and cue movement patterns to attain remaining goals. []  See Plan of Care  []  See progress note/recertification  []  See Discharge Summary         Progress towards goals / Updated goals:  Short Term Goals: To be accomplished in 2 weeks:  1. I and compliant with HEP for self management of symptoms. - Pt reports HEP compliance. 9/18/2019  2. Perform echo clams x4 without rest break to indicate improved glute med strength for stairs. - Met, able to perform cornerford clamshell series without breaks, expressed mm soreness. 9/23/2019     Long Term Goals: To be accomplished in 4 weeks:               1. Improve FOTO to 65 to indicate improved function with daily activities. 2. Increase right hip strength to grossly 4+/5 to improve stability for daily activities.   3. Demonstrate (-) 90/90 hamstring to indicate improve mobility for stairs.        PLAN  []  Upgrade activities as tolerated     [x]  Continue plan of care  []  Update interventions per flow sheet       []  Discharge due to:_  []  Other:_      Tmiothy Rudolph PTA 9/26/2019  11:18 AM    Future Appointments   Date Time Provider Christen Valenzuela   9/30/2019 10:30 AM [facility]   10/4/2019 10:30 AM Kajal Callahan PT [facility]   10/7/2019 10:30 AM Connie Marquez PTA [facility]

## 2019-09-30 ENCOUNTER — HOSPITAL ENCOUNTER (OUTPATIENT)
Dept: PHYSICAL THERAPY | Age: 29
Discharge: HOME | End: 2019-09-30
Payer: MEDICAID

## 2019-09-30 PROCEDURE — 97112 NEUROMUSCULAR REEDUCATION: CPT

## 2019-09-30 PROCEDURE — 97140 MANUAL THERAPY 1/> REGIONS: CPT

## 2019-09-30 NOTE — PROGRESS NOTES
PT DAILY TREATMENT NOTE 10-18    Patient Name: Brianna Yoder  Date:2019  : 1990  [x]  Patient  Verified  Payor: BLUE CROSS MEDICAID / Plan: [facility] HEALTHKEEPERS PLUS / Product Type: Managed Care Medicaid /    In time:10:42  Out time:11:08  Total Treatment Time (min): 26  Visit #: 6 of 8    Medicare/BCBS Only   Total Timed Codes (min):  26 1:1 Treatment Time:  26       Treatment Area: Pain in right knee [M25.561]    SUBJECTIVE  Pain Level (0-10 scale): /10  Any medication changes, allergies to medications, adverse drug reactions, diagnosis change, or new procedure performed?: [x] No    [] Yes (see summary sheet for update)  Subjective functional status/changes:   [] No changes reported  Pt late for appointment. OBJECTIVE    8 min Therapeutic Exercise:  [x] See flow sheet :   Rationale: increase ROM and increase strength to improve the patients ability to perform ADL's. 10 min Neuromuscular Re-education:  [x]  See flow sheet :   Rationale: increase strength and increase proprioception  to improve the patients ability to perform functional activities. 8 min Manual Therapy:  Stick to Right ITB, STM/DTM distal quads. Kinesiotape for right patellar lift. Rationale: decrease pain, increase tissue extensibility and decrease trigger points to improve ease of performing functional activities. With   [x] TE   [] TA   [] neuro   [] other: Patient Education: [x] Review HEP    [] Progressed/Changed HEP based on:   [] positioning   [] body mechanics   [] transfers   [] heat/ice application    [] other:      Other Objective/Functional Measures: Reports medial right knee discomfort/pain with dynamic activities today. Right 90/90 HS test (+) however ROM visually appears improved. Pain Level (0-10 scale) post treatment: 1/10    ASSESSMENT/Changes in Function: Partially able to correct Right knee valgus during eccentric step downs.  Pt expressed Right knee pain as dull ache with tape on, reported sharp pain prior to tape. Trial kinesiotape. Pt has F/U October 4th. Patient will continue to benefit from skilled PT services to modify and progress therapeutic interventions, address strength deficits, analyze and address soft tissue restrictions and analyze and modify body mechanics/ergonomics to attain remaining goals. [x]  See Plan of Care  []  See progress note/recertification  []  See Discharge Summary         Progress towards goals / Updated goals:  Short Term Goals: To be accomplished in 2 weeks:  1. I and compliant with HEP for self management of symptoms. - Pt reports HEP compliance. 9/18/2019  2. Perform echo clams x4 without rest break to indicate improved glute med strength for stairs. - Met, able to perform cornerford clamshell series without breaks, expressed mm soreness. 9/23/2019     Long Term Goals: To be accomplished in 4 weeks:               1. Improve FOTO to 65 to indicate improved function with daily activities. 2. Increase right hip strength to grossly 4+/5 to improve stability for daily activities. 3. Demonstrate (-) 90/90 hamstring to indicate improve mobility for stairs. - Right 90/90 HS test (+) however ROM visually appears improved.  9/30/2019    PLAN  []  Upgrade activities as tolerated     [x]  Continue plan of care  []  Update interventions per flow sheet       []  Discharge due to:_  []  Other:_      Janie Easton PTA 9/30/2019  10:43 AM    Future Appointments   Date Time Provider Christen Valenzuela   10/4/2019 10:30 AM Madiha Rendon, PT [facility]   10/7/2019 10:30 AM Micha Gaxiola PTA [facility]

## 2019-10-04 ENCOUNTER — HOSPITAL ENCOUNTER (OUTPATIENT)
Dept: PHYSICAL THERAPY | Age: 29
Discharge: HOME | End: 2019-10-04
Payer: MEDICAID

## 2019-10-04 PROCEDURE — 97140 MANUAL THERAPY 1/> REGIONS: CPT

## 2019-10-04 PROCEDURE — 97110 THERAPEUTIC EXERCISES: CPT

## 2019-10-04 NOTE — PROGRESS NOTES
PT DAILY TREATMENT NOTE 10-18    Patient Name: Fer Rounds  Date:10/4/2019  : 1990  [x]  Patient  Verified  Payor: BLUE CROSS MEDICAID / Plan: [facility] HEALTHKEEPERS PLUS / Product Type: Managed Care Medicaid /    In time:1034  Out time:1110  Total Treatment Time (min): 36  Visit #: 7 of 8    Medicare/BCBS Only   Total Timed Codes (min):  36 1:1 Treatment Time:  36       Treatment Area: Pain in right knee [M25.561]    SUBJECTIVE  Pain Level (0-10 scale): 2  Any medication changes, allergies to medications, adverse drug reactions, diagnosis change, or new procedure performed?: [x] No    [] Yes (see summary sheet for update)  Subjective functional status/changes:   [] No changes reported  The taping really helped, but my knee hurt worse once I removed the tape. OBJECTIVE      28 min Therapeutic Exercise:  [] See flow sheet :   Rationale: increase ROM, increase strength, improve coordination, improve balance and increase proprioception to improve the patients ability to recruit glutes for daily activities      8 min Manual Therapy:  Per flow sheet   Rationale: decrease pain, increase ROM, increase tissue extensibility and decrease trigger points to perform daily activities          With   [] TE   [] TA   [] neuro   [] other: Patient Education: [x] Review HEP    [] Progressed/Changed HEP based on:   [] positioning   [] body mechanics   [] transfers   [] heat/ice application    [] other:      Other Objective/Functional Measures:      Pain Level (0-10 scale) post treatment: 0-1    ASSESSMENT/Changes in Function: Multiple TrPs noted along right lateral quad and ITB; requested DN. Patient will continue to benefit from skilled PT services to modify and progress therapeutic interventions, address functional mobility deficits, address strength deficits, analyze and address soft tissue restrictions and analyze and cue movement patterns to attain remaining goals.      []  See Plan of Care  []  See progress note/recertification  []  See Discharge Summary         Progress towards goals / Updated goals:  Short Term Goals: To be accomplished in 2 weeks:  1. I and compliant with HEP for self management of symptoms. - Pt reports HEP compliance. 9/18/2019  2. Perform echo clams x4 without rest break to indicate improved glute med strength for stairs. - Met, able to perform cornerford clamshell series without breaks, expressed mm soreness. 9/23/2019     Long Term Goals: To be accomplished in 4 weeks:               1. Improve FOTO to 65 to indicate improved function with daily activities. 2. Increase right hip strength to grossly 4+/5 to improve stability for daily activities. 3. Demonstrate (-) 90/90 hamstring to indicate improve mobility for stairs. - Right 90/90 HS test (+) however ROM visually appears improved.  9/30/2019       PLAN  []  Upgrade activities as tolerated     []  Continue plan of care  []  Update interventions per flow sheet       []  Discharge due to:_  []  Other:_      ASHLEY Mas, CMTPT 10/4/2019  10:20 AM    Future Appointments   Date Time Provider Christen Valenzuela   10/4/2019 10:30 AM Teressa Junior, PT [facility]   10/7/2019 10:30 AM Eric Odom PTA [facility]

## 2019-10-04 NOTE — PROGRESS NOTES
Request for use of Dry Needling/Intramuscular Manual Therapy  Patient: Heath Gloria     Referral Source: Tiana Carranzao,*  Diagnosis: Pain in right knee [M25.561]      : 1990  Date of initial visit: 19   Attended visits: 7  Missed Visits: 0    Based on findings from the physical therapy examination and evaluation, the evaluating therapist believes the patient, Heath Gloria  would benefit from including Dry Needling as part of the plan of care. Dry needling is a treatment technique utilized in conjunction with other PT interventions to inactivate myofascial trigger points and the pain and dysfunction they cause. Dry Needling is an advanced procedure that requires additional training including greater than 54 hours of intensive course work. Physical Therapists at [address] [place] [address] are trained and/or certified through [employer] for their education. PROCEDURE:   Solid filament sterile needle (typically 0.3mm/30 gauge) inserted into a trigger point   Repeated movements inactivate the trigger points, taking 30-60 seconds per site   Typically consists of 1 dry needling session per week and a possible second treatment including muscle re-education, flexibility, strengthening and other manual techniques to facilitate the benefits of dry needling     BENEFITS:   Inactivation of trigger points   Decreased pain   Increased muscle length   Improved movement patterns   Restoration of function POTENTIAL RISKS:   Post-needling soreness   Infection   Bruising/bleeding   Penetration of a nerve   Pneumothorax   All treating PTs have been thoroughly educated in avoiding adverse reactions    If you agree with this recommendation, please sign this form and fax it to us at [phone]. If you have questions or concerns regarding dry needling or any other treatment we may be providing, please contact us at [phone].     Thank you for allowing us to assist in the care of your patient. Judge Freeman, PT    10/4/2019 10:49 AM     NOTE TO PHYSICIAN:  PLEASE COMPLETE THE ORDERS BELOW AND   FAX TO [facility]: [phone]  If you are unable to process this request in 24 hours please contact our office:   [phone]    I have read the above request and AGREE to the recommendation of including dry needling as part of the plan of care.       Physicians signature: _________________________Date: _________Time:________

## 2019-10-07 ENCOUNTER — APPOINTMENT (OUTPATIENT)
Dept: PHYSICAL THERAPY | Age: 29
End: 2019-10-07
Payer: MEDICAID

## 2019-10-11 ENCOUNTER — HOSPITAL ENCOUNTER (OUTPATIENT)
Dept: PHYSICAL THERAPY | Age: 29
Discharge: HOME | End: 2019-10-11
Payer: MEDICAID

## 2019-10-11 PROCEDURE — 97112 NEUROMUSCULAR REEDUCATION: CPT

## 2019-10-11 PROCEDURE — 97110 THERAPEUTIC EXERCISES: CPT

## 2019-10-11 NOTE — PROGRESS NOTES
PT DAILY TREATMENT NOTE 10-18    Patient Name: Carrol Spear  Date:10/11/2019  : 1990  [x]  Patient  Verified  Payor: BLUE CROSS MEDICAID / Plan: [facility] HEALTHKEEPERS PLUS / Product Type: Managed Care Medicaid /    In time:938  Out time:1015  Total Treatment Time (min): 37  Visit #: 8 of 8    Medicare/BCBS Only   Total Timed Codes (min):  27 1:1 Treatment Time:  27       Treatment Area: Pain in right knee [M25.561]    SUBJECTIVE  Pain Level (0-10 scale):  2  Any medication changes, allergies to medications, adverse drug reactions, diagnosis change, or new procedure performed?: [x] No    [] Yes (see summary sheet for update)  Subjective functional status/changes:   [x] No changes reported      OBJECTIVE    Modality rationale: decrease pain to improve the patients ability to tolerate post needle soreness   Min Type Additional Details    [] Estim:  []Unatt       []IFC  []Premod                        []Other:  []w/ice   []w/heat  Position:  Location:    [] Estim: []Att    []TENS instruct  []NMES                    []Other:  []w/US   []w/ice   []w/heat  Position:  Location:    []  Traction: [] Cervical       []Lumbar                       [] Prone          []Supine                       []Intermittent   []Continuous Lbs:  [] before manual  [] after manual    []  Ultrasound: []Continuous   [] Pulsed                           []1MHz   []3MHz W/cm2:  Location:    []  Iontophoresis with dexamethasone         Location: [] Take home patch   [] In clinic   10 [x]  Ice     []  heat  []  Ice massage  []  Laser   []  Anodyne Position:supine  Location:left knee    []  Laser with stim  []  Other:  Position:  Location:    []  Vasopneumatic Device Pressure:       [] lo [] med [] hi   Temperature: [] lo [] med [] hi   [] Skin assessment post-treatment:  []intact []redness- no adverse reaction    []redness  adverse reaction:       17 min Therapeutic Exercise:  [] See flow sheet :   Rationale: increase ROM and increase strength to improve the patients ability to perform daily activities     10 min Neuromuscular Re-education:  []  See flow sheet :   Rationale: increase ROM and increase strength  to improve the patients ability to ambulate   Dry Needling Procedure Note    Procedure: An intramuscular manual therapy (dry needling) and a neuro-muscular re-education treatment was done to deactivate myofascial trigger points with a 30 gauge filament needle under aseptic technique. Indications:  [x] Myofascial pain and dysfunction [] Muscled spasms  [x] Myalgia/myositis   [] Muscle cramps  [x] Muscle imbalances  [] Other:    Chart reviewed for the following:  Anabel MORALES, PT, have reviewed the medical history, summary list and precautions/contraindications for [employer].   TIME OUT performed immediately prior to start of procedure:  Anabel MORALES, PT, have performed the following reviews on [employer] prior to the start of the session:      [x] Verified patient identification by name and date of birth    [x] Agreement on all muscles being treated was verified   [x] Purpose of dry needling, side effects, possible complications, risks and benefits were explained to the patient   [x] Procedure site(s) verified  [x] Patient was positioned for comfort and draped for privacy  [x] Informed Consent was signed (initial visit) and verified verbally (subsequent visits)  [x] Patient was instructed on the need to report the use of blood thinners and/or immunosuppressant medications  [x] How to respond to possible adverse effects of treatment  [x] Self treatment of post needling soreness: ice, heat (moist heat, heat wraps) and stretching  [x] Opportunity was given to ask any questions, all questions were answered            Time: 938  Date of procedure: 10/11/2019    Treatment: The following muscles were treated today with intramuscular dry needling  [] Left [] Right Abdominals: Ant Rectus Abdominis  [] Left [] Right External Oblique / Internal Oblique / Transverse Abdominis  [] Left [] Right Thoracic Multifidi / Rotatores  [] Left [] Right Iliocostalis Thoracis / Lumborum  [] Left [] Right Longissimus Thoracis / Lumborum  [] Left [] Right Lumbar Multifidi  [] Left [] Right Serratus Posterior Inferior  [] Left [] Right Quadratus Lumborum  [] Left [] Right Psoas  [] Left [] Right Iliacus  [] Left [] Right Iliopsoas (inguinal)  [] Left [] Right Piriformis  [] Left [] Right Quadratus Femoris  [] Left [] Right Prashanth Gut / [employer] / [place] Salt  [] Left [] Right Obturator Internus  [] Left [] Right Obturator Externus / Ned Blare / Inferior Gemellus    [] Left [] Right Tensor Fasciae Nichole  [x] Left [] Right Iliotibial Band  [] Left [] Right Pectineus  [] Left [] Right Sartorius  [] Left [] Right Gracilis  [] Left [] Right Adductor Brevis / Adductor Longus / Adductor Wolf  [x] Left [] Right Rectus Femoris / Vastus Lateralis / Vastus Intermedius / Vastus Medialis Obliquus  [] Left [] Right Tibialis Anterior / Posterior  [] Left [] Right Extensor Digitorum Longus / Brevis  [] Left [] Right Extensor Hallucis Longus / Brevis  [] Left [] Right Hamstrings: Biceps Femoris / Yissel Cornfield / Semimembranosis  [] Left [] Right Popliteus / Planteris  [] Left [] Right Gastrocnemius Medial / Lateral  [] Left [] Right Soleus  [] Left [] Right Peronei: Longus / Garrie Ramus / Tertius  [] Left [] Right Flexor Digitorum Longus / Brevis  [] Left [] Right Flexor Hallucis Longus / Brevis  [] Left [] Right Quadratus Plantae  [] Left [] Right Abductor Digiti Minimi  [] Left [] Right Foot Interossei  [] Left [] Right Other:    Patient's response to today's treatment:  [x] Latent Twitch Response  [x] Muscle relaxation [] Pain Relief  [x] Post needling soreness [x] without complications  [] Increased Range of Motion  [] Other:     Performed by: Ramya Jeff PT            With   [] TE   [] TA   [] neuro   [] other: Patient Education: [x] Review HEP [] Progressed/Changed HEP based on:   [] positioning   [] body mechanics   [] transfers   [] heat/ice application    [] other:      Other Objective/Functional Measures: initiated DN     Pain Level (0-10 scale) post treatment: 1    ASSESSMENT/Changes in Function: Good tolerance to DN. Patient will benefit from continuing with PT to further progress towards goals. Patient will continue to benefit from skilled PT services to modify and progress therapeutic interventions, address strength deficits, analyze and address soft tissue restrictions and analyze and cue movement patterns to attain remaining goals. []  See Plan of Care  [x]  See progress note/recertification  []  See Discharge Summary         Progress towards goals / Updated goals:  Short Term Goals: To be accomplished in 2 weeks:  1. I and compliant with HEP for self management of symptoms. - Pt reports HEP compliance. 9/18/2019  2. Perform echo clams x4 without rest break to indicate improved glute med strength for stairs. - Met, able to perform cornerford clamshell series without breaks, expressed mm soreness. 9/23/2019     Long Term Goals: To be accomplished in 4 weeks:               1. Improve FOTO to 65 to indicate improved function with daily activities. 2. Increase right hip strength to grossly 4+/5 to improve stability for daily activities. 3. Demonstrate (-) 90/90 hamstring to indicate improve mobility for stairs. - Right 90/90 HS test (+) however ROM visually appears improved.  9/30/2019       PLAN  []  Upgrade activities as tolerated     [x]  Continue plan of care  []  Update interventions per flow sheet       []  Discharge due to:_  []  Other:_      Trice Hanley, ASHLEY, CMTPT 10/11/2019  9:41 AM    Future Appointments   Date Time Provider Christen Valenzuela   10[facility]16[facility]2019 10:30 [facility]   10/18/2019 11:00 AM Marta Roman PTA [facility]   10/22/2019 10:30 AM [facility] 10/24/2019 10:30 AM Jarrett Mac, PTA [facility]   10/29/2019 10:30 AM Tracy Zee, PT [facility]

## 2019-10-16 ENCOUNTER — HOSPITAL ENCOUNTER (OUTPATIENT)
Dept: PHYSICAL THERAPY | Age: 29
Discharge: HOME | End: 2019-10-16
Payer: MEDICAID

## 2019-10-16 PROCEDURE — 97140 MANUAL THERAPY 1/> REGIONS: CPT

## 2019-10-16 PROCEDURE — 97110 THERAPEUTIC EXERCISES: CPT

## 2019-10-16 NOTE — PROGRESS NOTES
PT DAILY TREATMENT NOTE 10-18    Patient Name: Yassine Syed  Date:10/16/2019  : 1990  [x]  Patient  Verified  Payor: BLUE CROSS MEDICAID / Plan: [facility] [employer] HEALTHKEEPERS PLUS / Product Type: Managed Care Medicaid /    In time:10:32  Out time:11:04  Total Treatment Time (min): 32  Visit #: 1 of 6    Medicare/BCBS Only   Total Timed Codes (min):  32 1:1 Treatment Time:  32       Treatment Area: Pain in right knee [M25.561]    SUBJECTIVE  Pain Level (0-10 scale): 3/10  Any medication changes, allergies to medications, adverse drug reactions, diagnosis change, or new procedure performed?: [x] No    [] Yes (see summary sheet for update)  Subjective functional status/changes:   [] No changes reported  \"It's achy this morning but I didn't have pain for 3 days after the last visit. \"    OBJECTIVE    24 min Therapeutic Exercise:  [x] See flow sheet :   Rationale: increase strength and increase proprioception to improve the patients ability to perform ADL's.    8 min Manual Therapy:  Stick to Right ITB and vastus lat. Rationale: decrease pain, increase tissue extensibility and decrease trigger points to improve ease of performing functional activities. With   [x] TE   [] TA   [] neuro   [] other: Patient Education: [x] Review HEP    [] Progressed/Changed HEP based on:   [] positioning   [] body mechanics   [] transfers   [] heat/ice application    [] other:      Other Objective/Functional Measures: Added core Align skater with 2 grey resistance bands. MMT Right hip 4/5 grossly. Pain Level (0-10 scale) post treatment: 2/10     ASSESSMENT/Changes in Function: Pt reports a decrease in pain from dry-needling previous visit. Continue PT to decrease mm restrictions, increase hip and knee strength to improve ease of performing functional tasks.     Patient will continue to benefit from skilled PT services to modify and progress therapeutic interventions, address strength deficits, analyze and address soft tissue restrictions, analyze and cue movement patterns and analyze and modify body mechanics/ergonomics to attain remaining goals. [x]  See Plan of Care  []  See progress note/recertification  []  See Discharge Summary         Progress towards goals / Updated goals:  Long Term Goals: To be accomplished in 4 weeks:  1. Improve FOTO to 65 to indicate improved function with daily activities. 2. Increase right hip strength to grossly 4+/5 to improve stability for daily activities. - Progressing, MMT Right hip 4/5 grossly. 10/16/2019  3. Demonstrate (-) 90/90 hamstring to indicate improve mobility for stairs. - Right 90/90 HS test (+) however ROM visually appears improved.  9/30/2019     PLAN  []  Upgrade activities as tolerated     [x]  Continue plan of care   []  Update interventions per flow sheet       []  Discharge due to:_  []  Other:_      [facility]ly Batch, PTA 10/16/2019  10:39 AM    Future Appointments   Date Time Provider Christen Valenzuela   10/18/2019 11:00 AM Jon Deluca PTA [facility]   10/22/2019 10:30 AM Vanessa Payton, PT [facility]   10/24/2019 10:30 AM Jon Deluca PTA [facility]   10/29/2019 10:30 AM Vanessa Payton, PT [facility]

## 2019-10-18 ENCOUNTER — HOSPITAL ENCOUNTER (OUTPATIENT)
Dept: PHYSICAL THERAPY | Age: 29
Discharge: HOME | End: 2019-10-18
Payer: MEDICAID

## 2019-10-18 PROCEDURE — 97140 MANUAL THERAPY 1/> REGIONS: CPT

## 2019-10-18 PROCEDURE — 97110 THERAPEUTIC EXERCISES: CPT

## 2019-10-18 NOTE — PROGRESS NOTES
PT DAILY TREATMENT NOTE 10-18    Patient Name: Barry Nichols  Date:10/18/2019  : 1990  [x]  Patient  Verified  Payor: BLUE CROSS MEDICAID / Plan: [facility] HEALTHKEEPERS PLUS / Product Type: Managed Care Medicaid /    In time:11:00  Out time:11:33  Total Treatment Time (min): 33  Visit #: 2 of 6    Medicare/BCBS Only   Total Timed Codes (min):  33 1:1 Treatment Time:  30       Treatment Area: Pain in right knee [M25.561]    SUBJECTIVE  Pain Level (0-10 scale): 3/10  Any medication changes, allergies to medications, adverse drug reactions, diagnosis change, or new procedure performed?: [x] No    [] Yes (see summary sheet for update)  Subjective functional status/changes:   [] No changes reported  \"Pain not as bad, it's sore right now because I just drove an hr and a half. \"    OBJECTIVE    25 min Therapeutic Exercise:  [x] See flow sheet :   Rationale: increase ROM, increase strength and increase proprioception to improve the patients ability to perform functional activities. 8 min Manual Therapy:  Stick to Right ITB, vastus lat. STM proximal to pes anserine. Rationale: decrease pain, increase tissue extensibility and decrease trigger points to improve ease of performing functional tasks. With   [x] TE   [] TA   [] neuro   [] other: Patient Education: [x] Review HEP    [] Progressed/Changed HEP based on:   [] positioning   [] body mechanics   [] transfers   [] heat/ice application    [] other:      Other Objective/Functional Measures: Increased core align resistance per flow sheet. Pain Level (0-10 scale) post treatment: 2/10    ASSESSMENT/Changes in Function: Pt reported a slight decrease in pain level post-treatment. Demonstrates decreased genu valgus during eccentric step downs. Continue PT to increase strength and improve ease of performing functional tasks.     Patient will continue to benefit from skilled PT services to modify and progress therapeutic interventions, address functional mobility deficits, address ROM deficits, address strength deficits, analyze and address soft tissue restrictions and analyze and modify body mechanics/ergonomics to attain remaining goals. [x]  See Plan of Care  []  See progress note/recertification  []  See Discharge Summary         Progress towards goals / Updated goals:  Long Term Goals: To be accomplished in 4 weeks:  1. Improve FOTO to 65 to indicate improved function with daily activities. 2. Increase right hip strength to grossly 4+/5 to improve stability for daily activities. - Progressing, MMT Right hip 4/5 grossly. 10/16/2019  3. Demonstrate (-) 90/90 hamstring to indicate improve mobility for stairs. - Right 90/90 HS test (+) however ROM visually appears improved.  9/30/2019     PLAN   []  Upgrade activities as tolerated     [x]  Continue plan of care  []  Update interventions per flow sheet       []  Discharge due to:_  []  Other:_      Paula Ledezma PTA 10/18/2019  11:14 AM    Future Appointments   Date Time Provider Christen Valenzuela   10/22/2019 10:30 AM Saskia Galarza PT [facility]   10/24/2019 10:30 AM Katerina Jade PTA [facility]   10/29/2019 10:30 AM Saskia Galarza PT [facility]

## 2019-10-22 ENCOUNTER — HOSPITAL ENCOUNTER (OUTPATIENT)
Dept: PHYSICAL THERAPY | Age: 29
Discharge: HOME | End: 2019-10-22
Payer: MEDICAID

## 2019-10-22 PROCEDURE — 97110 THERAPEUTIC EXERCISES: CPT

## 2019-10-22 PROCEDURE — 97112 NEUROMUSCULAR REEDUCATION: CPT

## 2019-10-22 NOTE — PROGRESS NOTES
PT DAILY TREATMENT NOTE 10-18    Patient Name: Perico Cortes  Date:10/22/2019  : 1990  [x]  Patient  Verified  Payor: BLUE CROSS MEDICAID / Plan: [employer] / Product Type: Managed Care Medicaid /    In time:1034  Out time:1115  Total Treatment Time (min): 41  Visit #: 3 of 6    Medicare/BCBS Only   Total Timed Codes (min):   1:1 Treatment Time:         Treatment Area: Pain in right knee [M25.561]    SUBJECTIVE  Pain Level (0-10 scale): 1  Any medication changes, allergies to medications, adverse drug reactions, diagnosis change, or new procedure performed?: [x] No    [] Yes (see summary sheet for update)  Subjective functional status/changes:   [] No changes reported  The needling really helped.      OBJECTIVE    Modality rationale: decrease pain to improve the patients ability to tolerate post needle soreness   Min Type Additional Details    [] Estim:  []Unatt       []IFC  []Premod                        []Other:  []w/ice   []w/heat  Position:  Location:    [] Estim: []Att    []TENS instruct  []NMES                    []Other:  []w/US   []w/ice   []w/heat  Position:  Location:    []  Traction: [] Cervical       []Lumbar                       [] Prone          []Supine                       []Intermittent   []Continuous Lbs:  [] before manual  [] after manual    []  Ultrasound: []Continuous   [] Pulsed                           []1MHz   []3MHz W/cm2:  Location:    []  Iontophoresis with dexamethasone         Location: [] Take home patch   [] In clinic   10 [x]  Ice     []  heat  []  Ice massage  []  Laser   []  Anodyne Position:supine  Location:left knee    []  Laser with stim  []  Other:  Position:  Location:    []  Vasopneumatic Device Pressure:       [] lo [] med [] hi   Temperature: [] lo [] med [] hi   [] Skin assessment post-treatment:  []intact []redness- no adverse reaction    []redness  adverse reaction:       23 min Therapeutic Exercise:  [] See flow sheet : Rationale: increase ROM and increase strength to improve the patients ability to perform daily activities     8 min Neuromuscular Re-education:  []  See flow sheet :   Rationale: increase strength, improve coordination, improve balance and increase proprioception  to improve the patients stability for daily activiites   Dry Needling Procedure Note    Procedure: An intramuscular manual therapy (dry needling) and a neuro-muscular re-education treatment was done to deactivate myofascial trigger points with a 30 gauge filament needle under aseptic technique. Indications:  [x] Myofascial pain and dysfunction [] Muscled spasms  [x] Myalgia/myositis   [] Muscle cramps  [x] Muscle imbalances  [] Other:    Chart reviewed for the following:  Jose MORALES PT, have reviewed the medical history, summary list and precautions/contraindications for [facility].   TIME OUT performed immediately prior to start of procedure:  Jose MORALES PT, have performed the following reviews on [facility] prior to the start of the session:      [x] Verified patient identification by name and date of birth    [x] Agreement on all muscles being treated was verified   [x] Purpose of dry needling, side effects, possible complications, risks and benefits were explained to the patient   [x] Procedure site(s) verified  [x] Patient was positioned for comfort and draped for privacy  [x] Informed Consent was signed (initial visit) and verified verbally (subsequent visits)  [x] Patient was instructed on the need to report the use of blood thinners and/or immunosuppressant medications  [x] How to respond to possible adverse effects of treatment  [x] Self treatment of post needling soreness: ice, heat (moist heat, heat wraps) and stretching  [x] Opportunity was given to ask any questions, all questions were answered            Time: 1100  Date of procedure: 10/22/2019    Treatment: The following muscles were treated today with intramuscular dry needling  [] Left [] Right Abdominals: Ant Rectus Abdominis  [] Left [] Right External Oblique / Internal Oblique / Transverse Abdominis  [] Left [] Right Thoracic Multifidi / Rotatores  [] Left [] Right Iliocostalis Thoracis / Lumborum  [] Left [] Right Longissimus Thoracis / Lumborum  [] Left [] Right Lumbar Multifidi  [] Left [] Right Serratus Posterior Inferior  [] Left [] Right Quadratus Lumborum  [] Left [] Right Psoas  [] Left [] Right Iliacus  [] Left [] Right Iliopsoas (inguinal)  [] Left [] Right Piriformis  [] Left [] Right Quadratus Femoris  [] Left [] Right Honey Canales / Levada Beatrice / Grady Client  [] Left [] Right Obturator Internus  [] Left [] Right Obturator Externus / Ole Horseman / Inferior Gemellus    [] Left [] Right Tensor Fasciae Nichole  [x] Left [] Right Iliotibial Band  [] Left [] Right Pectineus  [] Left [] Right Sartorius  [] Left [] Right Gracilis  [] Left [] Right Adductor Brevis / Adductor Longus / Adductor Wolf  [x] Left [] Right Rectus Femoris / Vastus Lateralis / Vastus Intermedius / Vastus Medialis Obliquus  [] Left [] Right Tibialis Anterior / Posterior  [] Left [] Right Extensor Digitorum Longus / Brevis  [] Left [] Right Extensor Hallucis Longus / Brevis  [] Left [] Right Hamstrings: Biceps Femoris / Benjiman Eaves / Semimembranosis  [] Left [] Right Popliteus / Planteris  [] Left [] Right Gastrocnemius Medial / Lateral  [] Left [] Right Soleus  [] Left [] Right Peronei: Longus / Sutton Frohlich / Tertius  [] Left [] Right Flexor Digitorum Longus / Brevis  [] Left [] Right Flexor Hallucis Longus / Brevis  [] Left [] Right Quadratus Plantae  [] Left [] Right Abductor Digiti Minimi  [] Left [] Right Foot Interossei  [] Left [] Right Other:    Patient's response to today's treatment:  [x] Latent Twitch Response  [x] Muscle relaxation [x] Pain Relief  [x] Post needling soreness [x] without complications  [] Increased Range of Motion  [] Other:     Performed by: Jillian Vergara, PT           With   [] TE   [] TA   [] neuro   [] other: Patient Education: [x] Review HEP    [] Progressed/Changed HEP based on:   [] positioning   [] body mechanics   [] transfers   [] heat/ice application    [] other:      Other Objective/Functional Measures:      Pain Level (0-10 scale) post treatment: 0    ASSESSMENT/Changes in Function: Able to elicit multiple twitches in ITB and lateral quad. Patient will continue to benefit from skilled PT services to modify and progress therapeutic interventions, address strength deficits, analyze and address soft tissue restrictions and analyze and cue movement patterns to attain remaining goals. []  See Plan of Care  []  See progress note/recertification  []  See Discharge Summary         Progress towards goals / Updated goals:  Long Term Goals: To be accomplished in 4 weeks:  1. Improve FOTO to 65 to indicate improved function with daily activities. 2. Increase right hip strength to grossly 4+/5 to improve stability for daily activities. - Progressing, MMT Right hip 4/5 grossly. 10/16/2019  3. Demonstrate (-) 90/90 hamstring to indicate improve mobility for stairs. - Right 90/90 HS test (+) however ROM visually appears improved.  9/30/2019        PLAN  []  Upgrade activities as tolerated     [x]  Continue plan of care  []  Update interventions per flow sheet       []  Discharge due to:_  []  Other:_      ASHLEY Nguyen, CMTPT 10/22/2019  9:25 AM    Future Appointments   Date Time Provider Christen Valenzuela   10/22/2019 10:30 AM Moe Luna PT [facility]   10/24/2019 10:30 AM Priti Fry PTA [facility]   10/29/2019 10:30 AM Moe Luna PT [facility]

## 2019-10-24 ENCOUNTER — APPOINTMENT (OUTPATIENT)
Dept: PHYSICAL THERAPY | Age: 29
End: 2019-10-24
Payer: MEDICAID

## 2019-10-29 ENCOUNTER — HOSPITAL ENCOUNTER (OUTPATIENT)
Dept: PHYSICAL THERAPY | Age: 29
Discharge: HOME | End: 2019-10-29
Payer: MEDICAID

## 2019-10-29 PROCEDURE — 97112 NEUROMUSCULAR REEDUCATION: CPT

## 2019-10-29 PROCEDURE — 97110 THERAPEUTIC EXERCISES: CPT

## 2019-10-29 NOTE — PROGRESS NOTES
Physical Therapy Discharge Instructions      [facility]  [address]  [place], [address]  [phone] fax    Patient: Perico Cortes  : 1990      Continue Home Exercise Program 2 times per day for 8 weeks, then decrease to 5 times per week      Continue with    [x] Ice  as needed prn times per day     [] Heat           Follow up with MD:     [x] Upon completion of therapy     [] As needed      Recommendations:     [x]   Return to activity with home program    []   Return to activity with the following modifications:       []Post Rehab Program    []Join Independent aquatic program     []Return to/join local gym        Additional Comments:           Dustin Lynn, PT 10/29/2019 11:05 AM

## 2019-10-29 NOTE — PROGRESS NOTES
PT DISCHARGE DAILY NOTE AND [id number]    Patient name: Barry Nichols Start of Care: 19   Referral source: Mingo Stroud,* : 1990   Medical/Treatment Diagnosis: Pain in right knee [M25.561] Onset Date:2017     Prior Hospitalization: see medical history Provider#: [id number]   Medications: Verified on Patient Summary List    Comorbidities: pregnancy (currently 11 weeks); scoliosis; latex allergy    Prior Level of Function: Able to stand, walk, and ascend/descend stairs without pain  Visits from Start of Care: 12    Missed Visits: 2    Reporting Period : 10/11/19 to 10/29/19    Date:10/29/2019  : 1990  [x]  Patient  Verified  Payor: BLUE CROSS MEDICAID / Plan: [employer] / Product Type: Managed Care Medicaid /    In time:1032  Out time:1103  Total Treatment Time (min): 31  Visit #: 3 of 6    Medicare/BCBS Only   Total Timed Codes (min):  31 1:1 Treatment Time:  31       SUBJECTIVE  Pain Level (0-10 scale): 1  Any medication changes, allergies to medications, adverse drug reactions, diagnosis change, or new procedure performed?: [x] No    [] Yes (see summary sheet for update)  Subjective functional status/changes:   [] No changes reported  The needling has really helped.       OBJECTIVE    Modality rationale: decrease pain to improve the patients ability to tolerate post needle soreness   Min Type Additional Details    [] Estim:  []Unatt       []IFC  []Premod                        []Other:  []w/ice   []w/heat  Position:  Location:    [] Estim: []Att    []TENS instruct  []NMES                    []Other:  []w/US   []w/ice   []w/heat  Position:  Location:    []  Traction: [] Cervical       []Lumbar                       [] Prone          []Supine                       []Intermittent   []Continuous Lbs:  [] before manual  [] after manual    []  Ultrasound: []Continuous   [] Pulsed                           []1MHz   []3MHz W/cm2:  Location:    []  Iontophoresis with dexamethasone         Location: [] Take home patch   [] In clinic   5 [x]  Ice     []  heat  []  Ice massage  []  Laser   []  Anodyne Position:supine  Location:left knee    []  Laser with stim  []  Other:  Position:  Location:    []  Vasopneumatic Device Pressure:       [] lo [] med [] hi   Temperature: [] lo [] med [] hi   [] Skin assessment post-treatment:  []intact []redness- no adverse reaction        8 min Therapeutic Exercise:  [] See flow sheet :   Rationale: increase strength to improve the patients ability to perform ADLs    23 min Neuromuscular Re-education:  []  See flow sheet :   Rationale: increase strength, improve coordination, improve balance and increase proprioception  to improve the patients ability to ascend/descend stairs without pain         With   [] TE   [] TA   [] neuro   [] other: Patient Education: [x] Review HEP    [] Progressed/Changed HEP based on:   [] positioning   [] body mechanics   [] transfers   [] heat/ice application    [] other:      Other Objective/Functional Measures: FOTO 69     Pain Level (0-10 scale) post treatment: 0    Summary of Care:  Goal: Improve FOTO to 65 to indicate improved function with daily activities. Status at last note/certification:64  Status at discharge: met    Goal: Increase right hip strength to grossly 4+/5 to improve stability for daily activities  Status at last note/certification:4/5  Status at discharge: met    Goal:Demonstrate (-) 90/90 hamstring to indicate improve mobility for stairs  Status at last note/certification:Right 92/91 HS test (+) however ROM visually appears improved  Status at discharge: met      ASSESSMENT/Changes in Function: All goals met; D/C to HEP for maintenance.      Thank you for this referral!      PLAN  []Discontinue therapy: []Patient has reached or is progressing toward set goals      []Patient is non-compliant or has abdicated      []Due to lack of appreciable progress towards set goals    Andy Goltz, PT 10/29/2019  9:54 AM

## 2023-02-09 ENCOUNTER — HOSPITAL ENCOUNTER (OUTPATIENT)
Dept: OCCUPATIONAL MEDICINE | Age: 33
Discharge: HOME | End: 2023-02-09
Attending: FAMILY MEDICINE

## 2023-02-09 ENCOUNTER — OFFICE VISIT (OUTPATIENT)
Dept: ORTHOPEDIC SURGERY | Age: 33
End: 2023-02-09
Payer: COMMERCIAL

## 2023-02-09 VITALS — BODY MASS INDEX: 22.13 KG/M2 | WEIGHT: 121 LBS

## 2023-02-09 DIAGNOSIS — M99.09 SOMATIC DYSFUNCTION OF ABDOMINAL REGION: ICD-10-CM

## 2023-02-09 DIAGNOSIS — M22.2X2 PATELLOFEMORAL SYNDROME, BILATERAL: Primary | ICD-10-CM

## 2023-02-09 DIAGNOSIS — M99.06 LOWER LIMB REGION SOMATIC DYSFUNCTION: ICD-10-CM

## 2023-02-09 DIAGNOSIS — M22.2X1 PATELLOFEMORAL SYNDROME, BILATERAL: Primary | ICD-10-CM
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
## 2023-02-09 DIAGNOSIS — M77.8 TOE TENDINITIS: ICD-10-CM

## 2023-02-09 DIAGNOSIS — M99.03 LUMBAR REGION SOMATIC DYSFUNCTION: ICD-10-CM

## 2023-02-09 DIAGNOSIS — M99.05 PELVIC SOMATIC DYSFUNCTION: ICD-10-CM

## 2023-02-09 DIAGNOSIS — M22.2X2 PATELLOFEMORAL SYNDROME, BILATERAL: ICD-10-CM

## 2023-02-09 DIAGNOSIS — M22.2X1 PATELLOFEMORAL SYNDROME, BILATERAL: ICD-10-CM

## 2023-02-09 DIAGNOSIS — M99.02 THORACIC REGION SOMATIC DYSFUNCTION: ICD-10-CM

## 2023-02-09 DIAGNOSIS — M99.07 UPPER EXTREMITY SOMATIC DYSFUNCTION: ICD-10-CM

## 2023-02-09 DIAGNOSIS — M99.04 SACRAL REGION SOMATIC DYSFUNCTION: ICD-10-CM

## 2023-02-09 DIAGNOSIS — M99.08 RIB CAGE REGION SOMATIC DYSFUNCTION: ICD-10-CM

## 2023-02-09 DIAGNOSIS — M99.01 CERVICAL SOMATIC DYSFUNCTION: ICD-10-CM

## 2023-02-09 RX ORDER — DICLOFENAC SODIUM 50 MG/1
50 TABLET, DELAYED RELEASE ORAL 2 TIMES DAILY
Qty: 60 TABLET | Refills: 1 | Status: SHIPPED | OUTPATIENT
Start: 2023-02-09
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
## 2023-02-09 NOTE — LETTER
2/9/2023    Patient: Dalton Chaidez   YOB: 1990   Date of Visit: 2/9/2023     Reg Pelayo MD  [address] [place] [address]  Via Fax: [phone]    Dear Reg Pelayo MD,      Thank you for referring Ms. Margaux Hampton to [place] [phone]. for evaluation. My notes for this consultation are attached. If you have questions, please do not hesitate to call me. I look forward to following your patient along with you.       Sincerely,    José Miguel Negrete, DO

## 2023-02-21 ENCOUNTER — TELEPHONE (OUTPATIENT)
Age: 33
End: 2023-02-21

## 2023-02-21 DIAGNOSIS — M22.2X2 PATELLOFEMORAL SYNDROME, BILATERAL: Primary | ICD-10-CM

## 2023-02-21 DIAGNOSIS — M77.8 TOE TENDINITIS: ICD-10-CM

## 2023-02-21 DIAGNOSIS — M22.2X1 PATELLOFEMORAL SYNDROME, BILATERAL: Primary | ICD-10-CM

## 2023-02-21 DIAGNOSIS — Z87.828 VICTIM OF PAST ASSAULT: ICD-10-CM

## 2023-02-21 NOTE — TELEPHONE ENCOUNTER
Patient called stating that [facility] does not have her order for PT and would like to know if an order can be resent over.       Please call and advise patient at   [phone]

## 2023-02-28 ENCOUNTER — HOSPITAL ENCOUNTER (OUTPATIENT)
Facility: HOSPITAL | Age: 33
Setting detail: RECURRING SERIES
Discharge: HOME | End: 2023-03-03
Payer: MEDICAID

## 2023-02-28 PROCEDURE — 97161 PT EVAL LOW COMPLEX 20 MIN: CPT
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
## 2023-03-14 ENCOUNTER — HOSPITAL ENCOUNTER (OUTPATIENT)
Facility: HOSPITAL | Age: 33
Setting detail: RECURRING SERIES
Discharge: HOME | End: 2023-03-17
Payer: MEDICAID

## 2023-03-14 PROCEDURE — 97110 THERAPEUTIC EXERCISES: CPT

## 2023-03-14 PROCEDURE — 97535 SELF CARE MNGMENT TRAINING: CPT

## 2023-03-14 NOTE — PROGRESS NOTES
PHYSICAL / OCCUPATIONAL THERAPY - DAILY TREATMENT NOTE (updated )    Patient Name: Amy Hermosillo    Date: 3/14/2023    : 1990  Insurance: Payor: [facility] MEDICAID / Plan: [employer] [facility] [employer] / Product Type: *No Product type* /      Patient  verified Yes     Visit #   Current / Total 2 12   Time   In / Out 11:02 11:42   Pain   In / Out /10 toe; 6/10 knee 5/10   Subjective Functional Status/Changes: Pt reports ongoing pain beneath her big toe on the left and swelling/pain to her right knee. She works 10 hour shifts. Her tailbone has been bothering her since play wrestling with a friend and landing wrong.    Changes to:  Meds, Allergies, Med Hx, Sx Hx?  If yes, update Summary List no       TREATMENT AREA =  Pain in left knee [M25.562]  Pain in left foot [M79.672]    OBJECTIVE    Therapeutic Procedures:    Tx Min Billable or 1:1 Min (if diff from Tx Min) Procedure, Rationale, Specifics   20 10 00341 Therapeutic Exercise (timed):  increase ROM, strength, coordination, balance, and proprioception to improve patient's ability to progress to PLOF and address remaining functional goals. (see flow sheet as applicable)     Details if applicable:       535 Self Care/Home Management (timed):  improve patient knowledge and understanding of pain reducing techniques, positioning, posture/ergonomics, home safety, activity modification, diagnosis/prognosis, and physical therapy expectations, procedures and progression  to improve patient's ability to progress to PLOF and address remaining functional goals.  (see flow sheet as applicable)     Details if applicable:  felt hole for pressure relief to lateral sesamoid to left great toe; pt education on LLD right shorter than left; correction of alignment (left upslip; left AI; shotgun technique; right sacral torsion)          Details if applicable:            Details if applicable:            Details if applicable:     40 30 [facility] Totals  Reminder: bill using total billable min of TIMED therapeutic procedures (example: do not include dry needle or estim unattended, both untimed codes, in totals to left)  8-22 min = 1 unit; 23-37 min = 2 units; 38-52 min = 3 units; 53-67 min = 4 units; 68-82 min = 5 units   Total Total     [x]  Patient Education billed concurrently with other procedures   [x] Review HEP    [] Progressed/Changed HEP, detail:    [] Other detail:       Objective Information/Functional Measures/Assessment  Left leg longer than right  TTP plantar aspect of left great toe; created a hole in felt to surround area for less pressure  Educated on orthotics for flat feet  Trial of 1/8 inch lift in right shoe for LLD  Decreased pain end of session  Swelling to right knee noted    Pt pleasant and motivated to improve with therapy. She has a left LLD with pain to the left plantar aspect of her lateral great toe along her sesamoid bones. She demonstrates right knee swelling and B pes planus. Will work to improve gait cycle, alignment and strength foe pain reduction with 10 hour work shifts at [address]. Patient will continue to benefit from skilled PT / OT services to modify and progress therapeutic interventions, analyze and address functional mobility deficits, analyze and address ROM deficits, analyze and address strength deficits, analyze and address soft tissue restrictions, analyze and cue for proper movement patterns, analyze and modify for postural abnormalities, analyze and address imbalance/dizziness, and instruct in home and community integration to address functional deficits and attain remaining goals. Progress toward goals / Updated goals:  [x]  See Progress Note/Recertification  Short Term Goals: To be accomplished in 1 weeks  Goal:Pt will be issued a HEP to improve function. Status at evaluation/last progress note:HEP issued. MET   Long Term Goals:  To be accomplished in 4 weeks   Goal:Pt will increase FOTO score by 18 pts to improve LE function. Status at evaluation/last progress note:FOTO=42   2. Goal:Pt will report left great toe pain at <4/10 to ease with return to ADL's and caring for her children. Status at evaluation/last progress note:Great Toe Pain=7/10   3. Goal:Pt will have no difficulty walking between rooms, per FOTO  Status at evaluation/last progress note: Reports Moderate difficulty, per FOTO. 4.   Goal: Pt will improve right and left knee Quad and HS strength to 4+/5 to ease with ambulation function. Status at evaluation/last progress note: Bilateral Quad and HS strength is 4/5.      PLAN  Yes  Continue plan of care  []  Upgrade activities as tolerated  []  Discharge due to :  []  Other:    Andrzej Prince PTA    3/14/2023    8:54 AM    Future Appointments   Date Time Provider Richar Berman   3/14/2023 11:00 AM Andrzej Prince PTA [id number]   3/23/2023 11:00 AM DO FLEX Ponce AMB

## 2023-03-16 ENCOUNTER — TELEPHONE (OUTPATIENT)
Facility: HOSPITAL | Age: 33
End: 2023-03-16

## 2023-03-21 ENCOUNTER — HOSPITAL ENCOUNTER (OUTPATIENT)
Facility: HOSPITAL | Age: 33
Setting detail: RECURRING SERIES
Discharge: HOME | End: 2023-03-24
Payer: MEDICAID

## 2023-03-21 PROCEDURE — 97112 NEUROMUSCULAR REEDUCATION: CPT

## 2023-03-21 PROCEDURE — 97110 THERAPEUTIC EXERCISES: CPT

## 2023-03-21 NOTE — PROGRESS NOTES
PTA [facility]   3/23/2023 11:00 AM DO KAILEE AnandTR BS AMB   3/28/2023 10:30 AM Apple Pereira, PT [id number] [facility]   3/30/2023  9:00 AM [facility], PTA [facility]   4/4/2023 11:30 AM [facility], PTA [facility]   4/6/2023 11:00 AM [facility], PTA [facility]   4/11/2023 11:00 AM Apple Pereira, PT [id number] [facility]

## 2023-03-23 ENCOUNTER — OFFICE VISIT (OUTPATIENT)
Age: 33
End: 2023-03-23

## 2023-03-23 VITALS — WEIGHT: 121 LBS | BODY MASS INDEX: 22.13 KG/M2

## 2023-03-23 DIAGNOSIS — M99.02 THORACIC REGION SOMATIC DYSFUNCTION: ICD-10-CM

## 2023-03-23 DIAGNOSIS — M99.04 SACRAL REGION SOMATIC DYSFUNCTION: ICD-10-CM

## 2023-03-23 DIAGNOSIS — M99.03 LUMBAR REGION SOMATIC DYSFUNCTION: ICD-10-CM

## 2023-03-23 DIAGNOSIS — M77.8 TOE TENDINITIS: Primary | ICD-10-CM

## 2023-03-23 DIAGNOSIS — Z87.828 VICTIM OF PAST ASSAULT: ICD-10-CM

## 2023-03-23 DIAGNOSIS — M99.01 CERVICAL SOMATIC DYSFUNCTION: ICD-10-CM

## 2023-03-23 DIAGNOSIS — M22.2X2 PATELLOFEMORAL SYNDROME, BILATERAL: ICD-10-CM

## 2023-03-23 DIAGNOSIS — M99.06 LOWER LIMB REGION SOMATIC DYSFUNCTION: ICD-10-CM
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
## 2023-03-23 DIAGNOSIS — M99.05 PELVIC SOMATIC DYSFUNCTION: ICD-10-CM

## 2023-03-23 DIAGNOSIS — M22.2X1 PATELLOFEMORAL SYNDROME, BILATERAL: ICD-10-CM

## 2023-03-23 DIAGNOSIS — M99.09 SOMATIC DYSFUNCTION OF ABDOMINAL REGION: ICD-10-CM

## 2023-03-23 DIAGNOSIS — M99.07 UPPER EXTREMITY SOMATIC DYSFUNCTION: ICD-10-CM

## 2023-03-23 DIAGNOSIS — M99.08 RIB CAGE REGION SOMATIC DYSFUNCTION: ICD-10-CM

## 2023-03-23 NOTE — PROGRESS NOTES
AVS reviewed: yes,   HEP: AT reviewed  Resources Provided: yes,  YouTube Videos  Patient questions/concerns answered: yes,   Patient verbalized understanding of treatment plan: yes,     Pt fit with stiff soled shoe

## 2023-03-23 NOTE — LETTER
March 23, 2023       Negra Elias YOB: 1990   [facility] [id number] Date of Visit:  3/23/2023       To Whom It May Concern: It is my medical opinion that Dolly Pump may return to work on 3/24/2023. Must wear stiff soled shoe and allow to sit as needed. Walk/stand as tolerated until follow up approximately 4 weeks. If you have any questions or concerns, please don't hesitate to call.     Sincerely,        Ethel Rivas, DO
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
## 2023-03-23 NOTE — PATIENT INSTRUCTIONS
Seated pull toes from top off foot away from shin. Should feel stretch on top of foot/front of ankle.       Search YouTube for my channel:    Dr. Aimee Barraza

## 2023-03-28 ENCOUNTER — HOSPITAL ENCOUNTER (OUTPATIENT)
Facility: HOSPITAL | Age: 33
Setting detail: RECURRING SERIES
Discharge: HOME | End: 2023-03-31
Payer: MEDICAID

## 2023-03-28 ENCOUNTER — TELEPHONE (OUTPATIENT)
Age: 33
End: 2023-03-28

## 2023-03-28 PROCEDURE — 97140 MANUAL THERAPY 1/> REGIONS: CPT

## 2023-03-28 PROCEDURE — 97530 THERAPEUTIC ACTIVITIES: CPT

## 2023-03-28 PROCEDURE — 97110 THERAPEUTIC EXERCISES: CPT

## 2023-03-28 NOTE — PROGRESS NOTES
PHYSICAL / OCCUPATIONAL THERAPY - DAILY TREATMENT NOTE (updated )    Patient Name: Deion Camp    Date: 3/28/2023    : 1990  Insurance: Payor: Braxton Urban / Plan: [address] / Product Type: *No Product type* /      Patient  verified Yes     Visit #   Current / Total 4 12   Time   In / Out 1035 1115   Pain   In / Out 10 toe/5 knee 8 toe   Subjective Functional Status/Changes: Reports her toe is throbbing and hurting a lot today. She has a shoe boot from MD, reports it helps. Reports pain and burning under her foot. Changes to:  Meds, Allergies, Med Hx, Sx Hx? If yes, update Summary List no       TREATMENT AREA =  Pain in left knee [M25.562]  Pain in left foot [M79.672]    OBJECTIVE         Therapeutic Procedures: Tx Min Billable or 1:1 Min (if diff from Tx Min) Procedure, Rationale, Specifics   15  05371 Therapeutic Exercise (timed):  increase ROM, strength, coordination, balance, and proprioception to improve patient's ability to progress to PLOF and address remaining functional goals. (see flow sheet as applicable)     Details if applicable:       10  Therapeutic Activity: Re evaluate Goals/FOTO.    15  73553 Manual Therapy (timed):  decrease pain, increase ROM, increase tissue extensibility, and decrease trigger points to improve patient's ability to progress to PLOF and address remaining functional goals. The manual therapy interventions were performed at a separate and distinct time from the therapeutic activities interventions . (see flow sheet as applicable)     Details if applicable:  gentle toe mobs/ toe splay/plantar fascia massage, gastroc TPR, Gentle Sacral PA mobs to relieve tightness and pressure to be able to sit to perform ex's.     [address]  [address] Reminder: bill using total billable min of TIMED therapeutic procedures (example: do not include dry needle or estim unattended, both untimed codes, in totals to left)  8-22 min = 1 unit; 23-37

## 2023-03-29 NOTE — THERAPY RECERTIFICATION
[address] [facility] PHYSICAL THERAPY  [address] [facility] - Ph: [phone]   Fx: [phone]  PHYSICAL THERAPY PROGRESS NOTE  Patient Name: Harmony Burton : 1990   Treatment/Medical Diagnosis: Pain in left knee [M25.562]  Pain in left foot [M79.672]   Referral Source: Brad Jacobson, DO     Date of Initial Visit: 23 Attended Visits: 4 Missed Visits: 1     SUMMARY OF TREATMENT  Pt has attended 4 visits only since Eval due to awaiting Insurance Auth. Pt is making limited progress with her left great toe function. Both knees are improving. Pt is wearing a stiff shoe given by her MD.   Pt has tenderness to Plantar surface of her Left great toe at the sesamoid bones as well as burning consistent with Plantar fascitis. She has been doing gentle stretches and ankle strengthening ex's. She reports re aggravating her toe when she picked up her child. He knees are improving gradually. Toe pain =10/10, Knee pain=5/10    CURRENT STATUS  See Progress Note/Recertification  Goal:Pt will be issued a HEP to improve function. Status at evaluation/last progress note:HEP issued. MET      Long Term Goals: To be accomplished in 4 weeks   Goal:Pt will increase FOTO score by 18 pts to improve LE function. Status at evaluation/last progress note:FOTO=42   Current: FOTO=53     2. Goal:Pt will report left great toe pain at <4/10 to ease with return to ADL's and caring for her children. Status at evaluation/last progress note:Great Toe Pain=7/10   Current: Pain=10/10      3. Goal:Pt will have no difficulty walking between rooms, per FOTO  Status at evaluation/last progress note: Reports Moderate difficulty, per FOTO. Current: some difficulty     4. Goal: Pt will improve right and left knee Quad and HS strength to 4+/5 to ease with ambulation function. Status at evaluation/last progress note: Bilateral Quad and HS strength is 4/5.    Current: Same

## 2023-03-30 ENCOUNTER — APPOINTMENT (OUTPATIENT)
Facility: HOSPITAL | Age: 33
End: 2023-03-30
Payer: MEDICAID

## 2023-04-04 ENCOUNTER — HOSPITAL ENCOUNTER (OUTPATIENT)
Facility: HOSPITAL | Age: 33
Setting detail: RECURRING SERIES
End: 2023-04-04
Payer: MEDICAID

## 2023-04-18 ENCOUNTER — HOSPITAL ENCOUNTER (OUTPATIENT)
Facility: HOSPITAL | Age: 33
Setting detail: RECURRING SERIES
Discharge: HOME | End: 2023-04-21
Payer: MEDICAID

## 2023-04-18 PROCEDURE — 97530 THERAPEUTIC ACTIVITIES: CPT

## 2023-04-18 PROCEDURE — 97110 THERAPEUTIC EXERCISES: CPT

## 2023-04-18 NOTE — PROGRESS NOTES
:  []  Other:    Zaid Frausto, TAMARA    4/18/2023    7:51 AM    Future Appointments   Date Time Provider Richar Berman   4/18/2023 11:20 AM Zaid Frausto PTA [facility]   4/20/2023 11:30 AM DO FLEX Santoyo BS AMB   4/20/2023  1:20 PM Zaid Frausto, TAMARA [facility]   4/25/2023 12:00 PM Danyel Damon, PT [id number] [facility]   4/27/2023  1:20 PM Zaid Frausto, PTA [facility]   5/2/2023 10:40 AM Zaid Frausto, PTA [facility]   5/4/2023  1:20 PM Zaid Frausto, PTA [facility]   5/9/2023 10:40 AM Zaid Frausto, PTA [facility]   5/11/2023 10:00 AM Zaid Frausto, PTA [facility]   5/16/2023 10:00 AM Zaid Frausto, PTA [facility]   5/18/2023 10:00 AM Zaid Frausto, PTA [facility]   5/23/2023 11:20 AM Danyel Damon, PT [id number] [facility]   5/26/2023  9:20 AM Marti Greene, PT [id number] [facility]

## 2023-04-20 ENCOUNTER — HOSPITAL ENCOUNTER (OUTPATIENT)
Facility: HOSPITAL | Age: 33
Discharge: HOME | End: 2023-04-23

## 2023-04-20 ENCOUNTER — HOSPITAL ENCOUNTER (OUTPATIENT)
Facility: HOSPITAL | Age: 33
Setting detail: RECURRING SERIES
Discharge: HOME | End: 2023-04-23
Payer: MEDICAID

## 2023-04-20 ENCOUNTER — OFFICE VISIT (OUTPATIENT)
Age: 33
End: 2023-04-20

## 2023-04-20 VITALS — WEIGHT: 121 LBS | HEIGHT: 62 IN | BODY MASS INDEX: 22.26 KG/M2 | RESPIRATION RATE: 14 BRPM

## 2023-04-20 DIAGNOSIS — M99.06 LOWER LIMB REGION SOMATIC DYSFUNCTION: ICD-10-CM

## 2023-04-20 DIAGNOSIS — M77.8 TOE TENDINITIS: ICD-10-CM

## 2023-04-20 DIAGNOSIS — M22.2X2 PATELLOFEMORAL SYNDROME, BILATERAL: Primary | ICD-10-CM
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
## 2023-04-20 DIAGNOSIS — M99.01 CERVICAL SOMATIC DYSFUNCTION: ICD-10-CM

## 2023-04-20 DIAGNOSIS — M99.03 LUMBAR REGION SOMATIC DYSFUNCTION: ICD-10-CM

## 2023-04-20 DIAGNOSIS — M99.05 PELVIC SOMATIC DYSFUNCTION: ICD-10-CM

## 2023-04-20 DIAGNOSIS — M22.2X1 PATELLOFEMORAL SYNDROME, BILATERAL: Primary | ICD-10-CM

## 2023-04-20 DIAGNOSIS — M99.08 RIB CAGE REGION SOMATIC DYSFUNCTION: ICD-10-CM

## 2023-04-20 DIAGNOSIS — M99.02 THORACIC REGION SOMATIC DYSFUNCTION: ICD-10-CM

## 2023-04-20 DIAGNOSIS — M99.04 SACRAL REGION SOMATIC DYSFUNCTION: ICD-10-CM

## 2023-04-20 DIAGNOSIS — M99.09 SOMATIC DYSFUNCTION OF ABDOMINAL REGION: ICD-10-CM

## 2023-04-20 DIAGNOSIS — M99.07 UPPER EXTREMITY SOMATIC DYSFUNCTION: ICD-10-CM

## 2023-04-20 PROCEDURE — 97110 THERAPEUTIC EXERCISES: CPT

## 2023-04-20 PROCEDURE — 97530 THERAPEUTIC ACTIVITIES: CPT

## 2023-04-20 PROCEDURE — 97112 NEUROMUSCULAR REEDUCATION: CPT

## 2023-04-20 NOTE — PROGRESS NOTES
HISTORY OF PRESENT ILLNESS    Lela Werner 1990 is a 29y.o. year old female comes in today to be evaluated and treated for: left great toe work comp, pain knees    Since last appt has noticed pain in toe (work injury [id number]) is better unless puts weight on it even with stiff shoe on but is improving. PT is going well. Pain level 5/10. Using voltaren 50mg with benefit. Unable to return work [employer] as cannot accommodate restrictions. Not work related: Pain knees better HEP but only for pes anserine, not PFS. IMAGING: XR left great toe pending    XR left foot 2023  1. No definite evidence of acute fracture. 2.  New lucency to the lateral hallux sesamoid which may indicate a multipartite   sesamoid or fracture. Bipartite medial hallux sesamoid. XR both knees 2023  No acute fracture or dislocation. Minimal degenerative changes. MRI right knee 3/6/2019  IMPRESSION:  Nonspecific anterior knee findings which when taken together often associated  with patellar tendon-lateral femoral condyle friction syndrome. Correlate for  patellar tracking abnormalities.       Past Surgical History:   Procedure Laterality Date     SECTION      OTHER SURGICAL HISTORY  1         Social History     Socioeconomic History    Marital status: Legally      Spouse name: None    Number of children: None    Years of education: None    Highest education level: None   Tobacco Use    Smoking status: Former     Types: Cigarettes     Quit date: 3/18/2019     Years since quittin.0    Smokeless tobacco: Never   Substance and Sexual Activity    Alcohol use: No    Drug use: Not Currently     Types: Prescription     Current Outpatient Medications   Medication Sig Dispense Refill    diclofenac (VOLTAREN) 50 MG EC tablet Take 1 tablet by mouth with breakfast and with evening meal 60 tablet 1    albuterol sulfate HFA (PROVENTIL;VENTOLIN;PROAIR) 108 (90 Base) MCG/ACT inhaler Inhale 2 puffs into

## 2023-04-25 ENCOUNTER — HOSPITAL ENCOUNTER (OUTPATIENT)
Facility: HOSPITAL | Age: 33
Setting detail: RECURRING SERIES
End: 2023-04-25
Payer: MEDICAID

## 2023-04-27 ENCOUNTER — HOSPITAL ENCOUNTER (OUTPATIENT)
Facility: HOSPITAL | Age: 33
Setting detail: RECURRING SERIES
Discharge: HOME | End: 2023-04-30
Payer: MEDICAID

## 2023-04-27 PROCEDURE — 97110 THERAPEUTIC EXERCISES: CPT

## 2023-04-27 PROCEDURE — 97530 THERAPEUTIC ACTIVITIES: CPT

## 2023-05-02 ENCOUNTER — HOSPITAL ENCOUNTER (OUTPATIENT)
Facility: HOSPITAL | Age: 33
Setting detail: RECURRING SERIES
Discharge: HOME | End: 2023-05-05
Payer: MEDICAID

## 2023-05-02 PROCEDURE — 97530 THERAPEUTIC ACTIVITIES: CPT

## 2023-05-02 PROCEDURE — 97110 THERAPEUTIC EXERCISES: CPT

## 2023-05-02 NOTE — PROGRESS NOTES
PHYSICAL / OCCUPATIONAL THERAPY - DAILY TREATMENT NOTE (updated )    Patient Name: Karyna Davis    Date: 2023    : 1990  Insurance: Payor: Kayla Coates / Plan: [employer]  Teja Milligan / Product Type: *No Product type* /      Patient  verified Yes     Visit #   Current / Total 1 8   Time   In / Out 10:45 11:20   Pain   In / Out 3/10 great toe, 1/10 right knee 4/10 great toe   Subjective Functional Status/Changes: Pt reports no significant changes. She still has pain under great toe. Changes to:  Meds, Allergies, Med Hx, Sx Hx? If yes, update Summary List no       TREATMENT AREA =  Pain in left knee [M25.562]  Pain in left foot [M79.672]    OBJECTIVE    Therapeutic Procedures: Tx Min Billable or 1:1 Min (if diff from Tx Min) Procedure, Rationale, Specifics   20  89747 Therapeutic Exercise (timed):  increase ROM, strength, coordination, balance, and proprioception to improve patient's ability to progress to PLOF and address remaining functional goals. (see flow sheet as applicable)     Details if applicable:       15  39204 Therapeutic Activity (timed):  use of dynamic activities replicating functional movements to increase ROM, strength, coordination, balance, and proprioception in order to improve patient's ability to progress to PLOF and address remaining functional goals.   (see flow sheet as applicable)      Details if applicable: trial of felt donut and felt insert with donut     28  [facility] Totals Reminder: bill using total billable min of TIMED therapeutic procedures (example: do not include dry needle or estim unattended, both untimed codes, in totals to left)  8-22 min = 1 unit; 23-37 min = 2 units; 38-52 min = 3 units; 53-67 min = 4 units; 68-82 min = 5 units   Total Total     [x]  Patient Education billed concurrently with other procedures   [x] Review HEP    [] Progressed/Changed HEP, detail:    [] Other detail:       Objective Information/Functional

## 2023-05-04 ENCOUNTER — APPOINTMENT (OUTPATIENT)
Facility: HOSPITAL | Age: 33
End: 2023-05-04
Payer: MEDICAID

## 2023-05-09 ENCOUNTER — HOSPITAL ENCOUNTER (OUTPATIENT)
Facility: HOSPITAL | Age: 33
Setting detail: RECURRING SERIES
Discharge: HOME | End: 2023-05-12
Payer: MEDICAID

## 2023-05-09 PROCEDURE — 97530 THERAPEUTIC ACTIVITIES: CPT

## 2023-05-09 PROCEDURE — 97110 THERAPEUTIC EXERCISES: CPT

## 2023-05-09 NOTE — PROGRESS NOTES
PHYSICAL / OCCUPATIONAL THERAPY - DAILY TREATMENT NOTE (updated )    Patient Name: Mady Garvey    Date: 2023    : 1990  Insurance: Payor: Ruby Navarro / Plan: Donna PEARL EXP [facility] / Product Type: *No Product type* /      Patient  verified Yes     Visit #   Current / Total 2 8   Time   In / Out 10:46 11:16   Pain   In / Out 1/10 right knee, 3/10 left  great toe 1/10 right knee, 2/10 left great toe   Subjective Functional Status/Changes: Pt reports she kept the felt donut in for a few hours but it kept sliding no matter how much she tightened the boot so she took it out. She wore her regular show yesterday and it feels about the same as the boot now. She is supposed to wear the boot until cleared by the doctor who she sees at the end of the month. Changes to:  Meds, Allergies, Med Hx, Sx Hx? If yes, update Summary List no       TREATMENT AREA =  Pain in left knee [M25.562]  Pain in left foot [M79.672]    OBJECTIVE    Therapeutic Procedures: Tx Min Billable or 1:1 Min (if diff from Tx Min) Procedure, Rationale, Specifics   15  37185 Therapeutic Exercise (timed):  increase ROM, strength, coordination, balance, and proprioception to improve patient's ability to progress to PLOF and address remaining functional goals. (see flow sheet as applicable)     Details if applicable:       15  11589 Therapeutic Activity (timed):  use of dynamic activities replicating functional movements to increase ROM, strength, coordination, balance, and proprioception in order to improve patient's ability to progress to PLOF and address remaining functional goals.   (see flow sheet as applicable)      Details if applicable: side stepping, fwd/retro monster walks, tandem walk with toe raises, wall squats, chair taps     30  MC BC Totals Reminder: bill using total billable min of TIMED therapeutic procedures (example: do not include dry needle or estim unattended, both untimed codes, in totals

## 2023-05-11 ENCOUNTER — APPOINTMENT (OUTPATIENT)
Facility: HOSPITAL | Age: 33
End: 2023-05-11
Payer: MEDICAID

## 2023-05-16 ENCOUNTER — HOSPITAL ENCOUNTER (OUTPATIENT)
Facility: HOSPITAL | Age: 33
Setting detail: RECURRING SERIES
Discharge: HOME | End: 2023-05-19
Payer: MEDICAID

## 2023-05-16 PROCEDURE — 97110 THERAPEUTIC EXERCISES: CPT

## 2023-05-16 PROCEDURE — 97530 THERAPEUTIC ACTIVITIES: CPT

## 2023-05-18 ENCOUNTER — APPOINTMENT (OUTPATIENT)
Facility: HOSPITAL | Age: 33
End: 2023-05-18
Payer: MEDICAID

## 2023-05-23 ENCOUNTER — HOSPITAL ENCOUNTER (OUTPATIENT)
Facility: HOSPITAL | Age: 33
Setting detail: RECURRING SERIES
End: 2023-05-23
Payer: MEDICAID

## 2023-05-24 ENCOUNTER — OFFICE VISIT (OUTPATIENT)
Age: 33
End: 2023-05-24

## 2023-05-24 VITALS — BODY MASS INDEX: 22.26 KG/M2 | RESPIRATION RATE: 14 BRPM | HEIGHT: 62 IN | WEIGHT: 121 LBS

## 2023-05-24 DIAGNOSIS — M99.07 UPPER EXTREMITY SOMATIC DYSFUNCTION: ICD-10-CM

## 2023-05-24 DIAGNOSIS — M99.02 THORACIC REGION SOMATIC DYSFUNCTION: ICD-10-CM

## 2023-05-24 DIAGNOSIS — M99.03 LUMBAR REGION SOMATIC DYSFUNCTION: ICD-10-CM

## 2023-05-24 DIAGNOSIS — M99.08 RIB CAGE REGION SOMATIC DYSFUNCTION: ICD-10-CM

## 2023-05-24 DIAGNOSIS — M99.01 CERVICAL SOMATIC DYSFUNCTION: ICD-10-CM

## 2023-05-24 DIAGNOSIS — M99.04 SACRAL REGION SOMATIC DYSFUNCTION: ICD-10-CM

## 2023-05-24 DIAGNOSIS — S80.01XA CONTUSION OF RIGHT KNEE, INITIAL ENCOUNTER: ICD-10-CM

## 2023-05-24 DIAGNOSIS — M99.05 PELVIC SOMATIC DYSFUNCTION: ICD-10-CM

## 2023-05-24 DIAGNOSIS — M77.8 TOE TENDINITIS: Primary | ICD-10-CM

## 2023-05-24 DIAGNOSIS — M22.2X1 PATELLOFEMORAL SYNDROME, BILATERAL: ICD-10-CM

## 2023-05-24 DIAGNOSIS — M99.06 LOWER LIMB REGION SOMATIC DYSFUNCTION: ICD-10-CM

## 2023-05-24 DIAGNOSIS — M22.2X2 PATELLOFEMORAL SYNDROME, BILATERAL: ICD-10-CM
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
## 2023-05-24 DIAGNOSIS — M99.09 SOMATIC DYSFUNCTION OF ABDOMINAL REGION: ICD-10-CM

## 2023-05-24 NOTE — PROGRESS NOTES
w/o limit 6/1/2023. Total time spent on encounter including chart/imaging/lab review and evaluation/documentation/demo home program/coordination of care/form completion but not including time for any procedures/manipulation 43 minutes.

## 2023-05-26 ENCOUNTER — APPOINTMENT (OUTPATIENT)
Facility: HOSPITAL | Age: 33
End: 2023-05-26
Payer: MEDICAID

## 2023-05-27 NOTE — PROGRESS NOTES
In [address]  [address]  [place], [address]  [phone] fax    Physical Therapy Progress Note  Patient name: Heath Gloria Start of Care: 19   Referral source: Tiana Grace,* : 1990   Medical/Treatment Diagnosis: Pain in right knee [M25.561]  Payor: [employer]ICA[employer] / Plan: [address] [place] [address] / Product Type: Managed Care Medicaid /  Onset Date:2017     Prior Hospitalization: see medical history Provider#: [id number]   Medications: Verified on Patient Summary List    Comorbidities: pregnancy (currently 11 weeks); scoliosis; latex allergy    Prior Level of Function: Able to stand, walk, and ascend/descend stairs without pain  Visits from Start of Care: 8    Missed Visits: 2      Established Goals:        Excellent         Good         Limited            None  [] Increased ROM   []  []  []  []  [] Increased Strength  []  []  []  []  [] Increased Mobility  []  []  []  []   [] Decreased Pain   []  []  []  []  [] Decreased Swelling  []  []  []  []    Key Functional Changes: FOTO 64 (19 point gain)    Updated Goals: to be achieved in 3 weeks:  Short Term Goals: To be accomplished in 2 weeks:  1. I and compliant with HEP for self management of symptoms. - Pt reports HEP compliance. 2019  2. Perform echo clams x4 without rest break to indicate improved glute med strength for stairs. - Met, able to perform cornerford clamshell series without breaks, expressed mm soreness. 2019     Long Term Goals: To be accomplished in 4 weeks:               1. Improve FOTO to 65 to indicate improved function with daily activities. 2. Increase right hip strength to grossly 4+/5 to improve stability for daily activities. 3. Demonstrate (-) 90/90 hamstring to indicate improve mobility for stairs. - Right 90/90 HS test (+) however ROM visually appears improved. 9/30/2019          ASSESSMENT/RECOMMENDATIONS:  [x]Continue therapy per initial plan/protocol at a frequency of  2 x per week for 3 weeks  []Continue therapy with the following recommended changes:_____________________      _____________________________________________________________________  []Discontinue therapy progressing towards or have reached established goals  []Discontinue therapy due to lack of appreciable progress towards goals  []Discontinue therapy due to lack of attendance or compliance  []Await Physician's recommendations/decisions regarding therapy  []Other:________________________________________________________________    Thank you for this referral.    Mick Childers, PT 10/11/2019 10:34 AM  NOTE TO PHYSICIAN:  PLEASE COMPLETE THE ORDERS BELOW AND   FAX TO [facility]: [phone]  If you are unable to process this request in 24 hours please contact our office: [phone]    ? I have read the above report and request that my patient continue as recommended. ? I have read the above report and request that my patient continue therapy with the following changes/special instructions:__________________________________________________________  ? I have read the above report and request that my patient be discharged from therapy.     Physicians signature: ______________________________Date: ______Time:______ PAST MEDICAL HISTORY:  HPV in female     S/P repair of vertebral fracture

## 2024-03-10 ENCOUNTER — HOSPITAL ENCOUNTER (EMERGENCY)
Facility: HOSPITAL | Age: 34
Discharge: HOME | End: 2024-03-10
Payer: MEDICAID

## 2024-03-10 ENCOUNTER — APPOINTMENT (OUTPATIENT)
Facility: HOSPITAL | Age: 34
End: 2024-03-10
Payer: MEDICAID

## 2024-03-10 VITALS
HEART RATE: 71 BPM | SYSTOLIC BLOOD PRESSURE: 110 MMHG | BODY MASS INDEX: 25.21 KG/M2 | DIASTOLIC BLOOD PRESSURE: 84 MMHG | WEIGHT: 137 LBS | OXYGEN SATURATION: 97 % | TEMPERATURE: 98.4 F | HEIGHT: 62 IN | RESPIRATION RATE: 16 BRPM

## 2024-03-10 DIAGNOSIS — R10.30 LOWER ABDOMINAL PAIN: ICD-10-CM

## 2024-03-10 DIAGNOSIS — N20.0 KIDNEY STONE: ICD-10-CM

## 2024-03-10 DIAGNOSIS — K80.80 BILIARY CALCULUS OF OTHER SITE WITHOUT OBSTRUCTION: ICD-10-CM

## 2024-03-10 DIAGNOSIS — B37.9 YEAST INFECTION: Primary | ICD-10-CM

## 2024-03-10 LAB
ALBUMIN SERPL-MCNC: 3.5 G/DL (ref 3.4–5)
ALBUMIN/GLOB SERPL: 0.9 (ref 0.8–1.7)
ALP SERPL-CCNC: 71 U/L (ref 45–117)
ALT SERPL-CCNC: 15 U/L (ref 13–56)
ANION GAP SERPL CALC-SCNC: 4 MMOL/L (ref 3–18)
APPEARANCE UR: CLEAR
AST SERPL-CCNC: 13 U/L (ref 10–38)
BACTERIA URNS QL MICRO: ABNORMAL /HPF
BASOPHILS # BLD: 0 K/UL (ref 0–0.1)
BASOPHILS NFR BLD: 1 % (ref 0–2)
BILIRUB SERPL-MCNC: 0.4 MG/DL (ref 0.2–1)
BILIRUB UR QL: NEGATIVE
BUN SERPL-MCNC: 8 MG/DL (ref 7–18)
BUN/CREAT SERPL: 12 (ref 12–20)
CALCIUM SERPL-MCNC: 8.9 MG/DL (ref 8.5–10.1)
CHLORIDE SERPL-SCNC: 110 MMOL/L (ref 100–111)
CO2 SERPL-SCNC: 28 MMOL/L (ref 21–32)
COLOR UR: YELLOW
CREAT SERPL-MCNC: 0.68 MG/DL (ref 0.6–1.3)
DIFFERENTIAL METHOD BLD: ABNORMAL
EOSINOPHIL # BLD: 0.3 K/UL (ref 0–0.4)
EOSINOPHIL NFR BLD: 4 % (ref 0–5)
EPITH CASTS URNS QL MICRO: ABNORMAL /LPF (ref 0–5)
ERYTHROCYTE [DISTWIDTH] IN BLOOD BY AUTOMATED COUNT: 13 % (ref 11.6–14.5)
ETHANOL SERPL-MCNC: <3 MG/DL (ref 0–3)
GLOBULIN SER CALC-MCNC: 3.7 G/DL (ref 2–4)
GLUCOSE SERPL-MCNC: 93 MG/DL (ref 74–99)
GLUCOSE UR STRIP.AUTO-MCNC: NEGATIVE MG/DL
HCG SERPL QL: NEGATIVE
HCT VFR BLD AUTO: 44.3 % (ref 35–45)
HGB BLD-MCNC: 14.5 G/DL (ref 12–16)
HGB UR QL STRIP: ABNORMAL
IMM GRANULOCYTES # BLD AUTO: 0 K/UL (ref 0–0.04)
IMM GRANULOCYTES NFR BLD AUTO: 0 % (ref 0–0.5)
KETONES UR QL STRIP.AUTO: NEGATIVE MG/DL
LEUKOCYTE ESTERASE UR QL STRIP.AUTO: NEGATIVE
LIPASE SERPL-CCNC: 24 U/L (ref 13–75)
LYMPHOCYTES # BLD: 1.9 K/UL (ref 0.9–3.6)
LYMPHOCYTES NFR BLD: 26 % (ref 21–52)
MAGNESIUM SERPL-MCNC: 2.1 MG/DL (ref 1.6–2.6)
MCH RBC QN AUTO: 31.5 PG (ref 24–34)
MCHC RBC AUTO-ENTMCNC: 32.7 G/DL (ref 31–37)
MCV RBC AUTO: 96.3 FL (ref 78–100)
MONOCYTES # BLD: 0.5 K/UL (ref 0.05–1.2)
MONOCYTES NFR BLD: 7 % (ref 3–10)
NEUTS SEG # BLD: 4.8 K/UL (ref 1.8–8)
NEUTS SEG NFR BLD: 63 % (ref 40–73)
NITRITE UR QL STRIP.AUTO: NEGATIVE
NRBC # BLD: 0 K/UL (ref 0–0.01)
NRBC BLD-RTO: 0 PER 100 WBC
PH UR STRIP: 6.5 (ref 5–8)
PLATELET # BLD AUTO: 314 K/UL (ref 135–420)
PMV BLD AUTO: 8.8 FL (ref 9.2–11.8)
POTASSIUM SERPL-SCNC: 3.7 MMOL/L (ref 3.5–5.5)
PROT SERPL-MCNC: 7.2 G/DL (ref 6.4–8.2)
PROT UR STRIP-MCNC: NEGATIVE MG/DL
RBC # BLD AUTO: 4.6 M/UL (ref 4.2–5.3)
RBC #/AREA URNS HPF: ABNORMAL /HPF (ref 0–5)
SERVICE CMNT-IMP: NORMAL
SODIUM SERPL-SCNC: 142 MMOL/L (ref 136–145)
SP GR UR REFRACTOMETRY: 1.01 (ref 1–1.03)
UROBILINOGEN UR QL STRIP.AUTO: 0.2 EU/DL (ref 0.2–1)
WBC # BLD AUTO: 7.6 K/UL (ref 4.6–13.2)
WBC URNS QL MICRO: ABNORMAL /HPF (ref 0–4)
WET PREP GENITAL: NORMAL

## 2024-03-10 PROCEDURE — 82077 ASSAY SPEC XCP UR&BREATH IA: CPT

## 2024-03-10 PROCEDURE — 2580000003 HC RX 258: Performed by: NURSE PRACTITIONER

## 2024-03-10 PROCEDURE — 81001 URINALYSIS AUTO W/SCOPE: CPT

## 2024-03-10 PROCEDURE — 85025 COMPLETE CBC W/AUTO DIFF WBC: CPT

## 2024-03-10 PROCEDURE — 84703 CHORIONIC GONADOTROPIN ASSAY: CPT

## 2024-03-10 PROCEDURE — 87210 SMEAR WET MOUNT SALINE/INK: CPT

## 2024-03-10 PROCEDURE — 83690 ASSAY OF LIPASE: CPT

## 2024-03-10 PROCEDURE — 6360000004 HC RX CONTRAST MEDICATION: Performed by: NURSE PRACTITIONER

## 2024-03-10 PROCEDURE — 96374 THER/PROPH/DIAG INJ IV PUSH: CPT

## 2024-03-10 PROCEDURE — A4216 STERILE WATER/SALINE, 10 ML: HCPCS | Performed by: NURSE PRACTITIONER

## 2024-03-10 PROCEDURE — 6360000002 HC RX W HCPCS: Performed by: NURSE PRACTITIONER

## 2024-03-10 PROCEDURE — 80053 COMPREHEN METABOLIC PANEL: CPT

## 2024-03-10 PROCEDURE — 83735 ASSAY OF MAGNESIUM: CPT

## 2024-03-10 PROCEDURE — 2500000003 HC RX 250 WO HCPCS: Performed by: NURSE PRACTITIONER

## 2024-03-10 PROCEDURE — 99285 EMERGENCY DEPT VISIT HI MDM: CPT

## 2024-03-10 PROCEDURE — 96375 TX/PRO/DX INJ NEW DRUG ADDON: CPT

## 2024-03-10 PROCEDURE — 74177 CT ABD & PELVIS W/CONTRAST: CPT

## 2024-03-10 RX ORDER — ONDANSETRON 2 MG/ML
4 INJECTION INTRAMUSCULAR; INTRAVENOUS ONCE
Status: COMPLETED | OUTPATIENT
Start: 2024-03-10 | End: 2024-03-10

## 2024-03-10 RX ORDER — ONDANSETRON 4 MG/1
4 TABLET, ORALLY DISINTEGRATING ORAL EVERY 8 HOURS PRN
Qty: 20 TABLET | Refills: 0 | Status: SHIPPED | OUTPATIENT
Start: 2024-03-10 | End: 2024-03-17

## 2024-03-10 RX ORDER — TAMSULOSIN HYDROCHLORIDE 0.4 MG/1
0.4 CAPSULE ORAL DAILY
Qty: 7 CAPSULE | Refills: 0 | Status: SHIPPED | OUTPATIENT
Start: 2024-03-10

## 2024-03-10 RX ORDER — KETOROLAC TROMETHAMINE 10 MG/1
10 TABLET, FILM COATED ORAL EVERY 6 HOURS PRN
Qty: 20 TABLET | Refills: 0 | Status: SHIPPED | OUTPATIENT
Start: 2024-03-10 | End: 2024-03-15

## 2024-03-10 RX ORDER — KETOROLAC TROMETHAMINE 15 MG/ML
15 INJECTION, SOLUTION INTRAMUSCULAR; INTRAVENOUS ONCE
Status: COMPLETED | OUTPATIENT
Start: 2024-03-10 | End: 2024-03-10

## 2024-03-10 RX ORDER — FLUCONAZOLE 150 MG/1
TABLET ORAL
Qty: 2 TABLET | Refills: 0 | Status: SHIPPED | OUTPATIENT
Start: 2024-03-10

## 2024-03-10 RX ADMIN — FAMOTIDINE 20 MG: 10 INJECTION, SOLUTION INTRAVENOUS at 13:21

## 2024-03-10 RX ADMIN — IOPAMIDOL 80 ML: 612 INJECTION, SOLUTION INTRAVENOUS at 13:47

## 2024-03-10 RX ADMIN — KETOROLAC TROMETHAMINE 15 MG: 15 INJECTION, SOLUTION INTRAMUSCULAR; INTRAVENOUS at 13:21

## 2024-03-10 RX ADMIN — ONDANSETRON 4 MG: 2 INJECTION INTRAMUSCULAR; INTRAVENOUS at 13:20

## 2024-03-10 ASSESSMENT — PAIN DESCRIPTION - ORIENTATION: ORIENTATION: ANTERIOR

## 2024-03-10 ASSESSMENT — PAIN DESCRIPTION - LOCATION
LOCATION: ABDOMEN;BACK
LOCATION: ABDOMEN;BACK

## 2024-03-10 ASSESSMENT — PAIN - FUNCTIONAL ASSESSMENT: PAIN_FUNCTIONAL_ASSESSMENT: 0-10

## 2024-03-10 ASSESSMENT — PAIN SCALES - GENERAL
PAINLEVEL_OUTOF10: 5
PAINLEVEL_OUTOF10: 6

## 2024-03-10 NOTE — ED TRIAGE NOTES
PATIENT PRESENTED TO THE EMERGENCY DEPT WITH A COMPLAINT OF SEVERE LOWER ABDOMINAL PAIN RADIATING TO LOWER BACK, BLOATED X LAST NIGHT AND  VAGINAL ITCHING X 3 DAYS     PATIENT STATES THAT SHE IS HAS BEEN A HEAVY DRINKER AND WOULD WANT TO STOP SO SINCE SHE HAS TRIED STOPPING THESE SYMPTOMS STARTED      PATIENT ALERT AND ORIENTED X 4, PATIENT BREATHES FREELY ON ROOM AIR IN NIL CARDIOPULMOANRY DISTRESS

## 2024-03-10 NOTE — DISCHARGE INSTRUCTIONS
Take medication as prescribed. Follow-up with your urology within 2 days for reassessment. Bring the results from this visit with you for their review. Return to the ED immediately for any new, worsening, or persistent symptoms, including fever, vomiting, chest pain, shortness of breath, or any other medical concerns.

## 2024-03-10 NOTE — ED PROVIDER NOTES
EMERGENCY DEPARTMENT HISTORY AND PHYSICAL EXAM    3:23 PM      Date: 3/10/2024  Patient Name: Amy Hermosillo    History of Presenting Illness     Chief Complaint   Patient presents with    Abdominal Pain    Bloated    Back Pain    Vaginal Itching         History Provided By: Patient and medical chart review.    Additional History (Context): Amy Hermosillo is a 33 y.o. female with   Past Medical History:   Diagnosis Date    Asthma     no diagnosis per pt but has inhaler    Hypermobile joints     arms, hip, upper back    Scoliosis    who presents with complaints of abdominal pain, back pain, and vaginal itching.  States the abdominal pain and back pain been going on for about a week.  It is bilateral lower going into her back.  Denies any vomiting or diarrhea.  Admits to some nausea.  Patient denies any urinary symptoms.  Patient also states vaginal itching after going on for about 3 days.  It is white thick and cottage cheese.  No odor.  States she bought a Monistat 7-day but is only taking 1 dose of it so far.      Patient reports she stopped drinking alcohol about a week ago.  States this is when her abdominal pain and back pain started.  States she usually drinks about 4 to 6 days a week and will have vodka or whiskey 375 mL to herself.  Patient is also a daily marijuana use.  States she has been taking hot showers tried some Motrin and some leftover amoxicillin no relief in pain.  Patient denies any other drug use besides the marijuana.  Patient currently rating pain 4 out of 10.  States this has improved.    PCP: Sarah Pineda MD    No current facility-administered medications for this encounter.     Current Outpatient Medications   Medication Sig Dispense Refill    tamsulosin (FLOMAX) 0.4 MG capsule Take 1 capsule by mouth daily 7 capsule 0    ketorolac (TORADOL) 10 MG tablet Take 1 tablet by mouth every 6 hours as needed for Pain 20 tablet 0    ondansetron (ZOFRAN-ODT) 4 MG disintegrating tablet Place

## 2024-03-18 ENCOUNTER — HOSPITAL ENCOUNTER (EMERGENCY)
Facility: HOSPITAL | Age: 34
Discharge: HOME | End: 2024-03-19
Attending: EMERGENCY MEDICINE
Payer: MEDICAID

## 2024-03-18 DIAGNOSIS — K80.50 BILIARY COLIC: Primary | ICD-10-CM

## 2024-03-18 LAB
ALBUMIN SERPL-MCNC: 3.7 G/DL (ref 3.4–5)
ALBUMIN/GLOB SERPL: 1 (ref 0.8–1.7)
ALP SERPL-CCNC: 91 U/L (ref 45–117)
ALT SERPL-CCNC: 20 U/L (ref 13–56)
ANION GAP SERPL CALC-SCNC: 7 MMOL/L (ref 3–18)
APPEARANCE UR: CLEAR
AST SERPL-CCNC: 14 U/L (ref 10–38)
BASOPHILS # BLD: 0.1 K/UL (ref 0–0.1)
BASOPHILS NFR BLD: 1 % (ref 0–2)
BILIRUB SERPL-MCNC: 0.3 MG/DL (ref 0.2–1)
BILIRUB UR QL: NEGATIVE
BUN SERPL-MCNC: 10 MG/DL (ref 7–18)
BUN/CREAT SERPL: 10 (ref 12–20)
CALCIUM SERPL-MCNC: 9.6 MG/DL (ref 8.5–10.1)
CHLORIDE SERPL-SCNC: 101 MMOL/L (ref 100–111)
CO2 SERPL-SCNC: 31 MMOL/L (ref 21–32)
COLOR UR: YELLOW
CREAT SERPL-MCNC: 0.99 MG/DL (ref 0.6–1.3)
DIFFERENTIAL METHOD BLD: ABNORMAL
EOSINOPHIL # BLD: 0.4 K/UL (ref 0–0.4)
EOSINOPHIL NFR BLD: 4 % (ref 0–5)
ERYTHROCYTE [DISTWIDTH] IN BLOOD BY AUTOMATED COUNT: 13.1 % (ref 11.6–14.5)
GLOBULIN SER CALC-MCNC: 3.6 G/DL (ref 2–4)
GLUCOSE SERPL-MCNC: 113 MG/DL (ref 74–99)
GLUCOSE UR STRIP.AUTO-MCNC: NEGATIVE MG/DL
HCG UR QL: NEGATIVE
HCT VFR BLD AUTO: 46.8 % (ref 35–45)
HGB BLD-MCNC: 15.1 G/DL (ref 12–16)
HGB UR QL STRIP: NEGATIVE
IMM GRANULOCYTES # BLD AUTO: 0 K/UL (ref 0–0.04)
IMM GRANULOCYTES NFR BLD AUTO: 0 % (ref 0–0.5)
KETONES UR QL STRIP.AUTO: NEGATIVE MG/DL
LEUKOCYTE ESTERASE UR QL STRIP.AUTO: NEGATIVE
LIPASE SERPL-CCNC: 53 U/L (ref 13–75)
LYMPHOCYTES # BLD: 3.1 K/UL (ref 0.9–3.6)
LYMPHOCYTES NFR BLD: 32 % (ref 21–52)
MCH RBC QN AUTO: 31.4 PG (ref 24–34)
MCHC RBC AUTO-ENTMCNC: 32.3 G/DL (ref 31–37)
MCV RBC AUTO: 97.3 FL (ref 78–100)
MONOCYTES # BLD: 0.8 K/UL (ref 0.05–1.2)
MONOCYTES NFR BLD: 8 % (ref 3–10)
NEUTS SEG # BLD: 5.3 K/UL (ref 1.8–8)
NEUTS SEG NFR BLD: 55 % (ref 40–73)
NITRITE UR QL STRIP.AUTO: NEGATIVE
NRBC # BLD: 0 K/UL (ref 0–0.01)
NRBC BLD-RTO: 0 PER 100 WBC
PH UR STRIP: 6.5 (ref 5–8)
PLATELET # BLD AUTO: 333 K/UL (ref 135–420)
PMV BLD AUTO: 9 FL (ref 9.2–11.8)
POTASSIUM SERPL-SCNC: 4.3 MMOL/L (ref 3.5–5.5)
PROT SERPL-MCNC: 7.3 G/DL (ref 6.4–8.2)
PROT UR STRIP-MCNC: NEGATIVE MG/DL
RBC # BLD AUTO: 4.81 M/UL (ref 4.2–5.3)
SODIUM SERPL-SCNC: 139 MMOL/L (ref 136–145)
SP GR UR REFRACTOMETRY: 1.01 (ref 1–1.03)
TROPONIN I SERPL HS-MCNC: <3 NG/L (ref 0–54)
UROBILINOGEN UR QL STRIP.AUTO: 1 EU/DL (ref 0.2–1)
WBC # BLD AUTO: 9.7 K/UL (ref 4.6–13.2)

## 2024-03-18 PROCEDURE — 96376 TX/PRO/DX INJ SAME DRUG ADON: CPT

## 2024-03-18 PROCEDURE — 96374 THER/PROPH/DIAG INJ IV PUSH: CPT

## 2024-03-18 PROCEDURE — 96375 TX/PRO/DX INJ NEW DRUG ADDON: CPT

## 2024-03-18 PROCEDURE — 81025 URINE PREGNANCY TEST: CPT

## 2024-03-18 PROCEDURE — 6370000000 HC RX 637 (ALT 250 FOR IP): Performed by: EMERGENCY MEDICINE

## 2024-03-18 PROCEDURE — 83690 ASSAY OF LIPASE: CPT

## 2024-03-18 PROCEDURE — 93005 ELECTROCARDIOGRAM TRACING: CPT | Performed by: EMERGENCY MEDICINE

## 2024-03-18 PROCEDURE — 99284 EMERGENCY DEPT VISIT MOD MDM: CPT

## 2024-03-18 PROCEDURE — 81003 URINALYSIS AUTO W/O SCOPE: CPT

## 2024-03-18 PROCEDURE — 80053 COMPREHEN METABOLIC PANEL: CPT

## 2024-03-18 PROCEDURE — 84484 ASSAY OF TROPONIN QUANT: CPT

## 2024-03-18 PROCEDURE — 85025 COMPLETE CBC W/AUTO DIFF WBC: CPT

## 2024-03-18 PROCEDURE — 6360000002 HC RX W HCPCS: Performed by: EMERGENCY MEDICINE

## 2024-03-18 RX ORDER — KETOROLAC TROMETHAMINE 15 MG/ML
15 INJECTION, SOLUTION INTRAMUSCULAR; INTRAVENOUS ONCE
Status: COMPLETED | OUTPATIENT
Start: 2024-03-18 | End: 2024-03-18

## 2024-03-18 RX ORDER — ONDANSETRON 2 MG/ML
4 INJECTION INTRAMUSCULAR; INTRAVENOUS ONCE
Status: COMPLETED | OUTPATIENT
Start: 2024-03-18 | End: 2024-03-18

## 2024-03-18 RX ORDER — LIDOCAINE HYDROCHLORIDE 20 MG/ML
15 SOLUTION OROPHARYNGEAL
Status: COMPLETED | OUTPATIENT
Start: 2024-03-18 | End: 2024-03-18

## 2024-03-18 RX ADMIN — ALUMINUM HYDROXIDE AND MAGNESIUM HYDROXIDE 30 ML: 200; 200 SUSPENSION ORAL at 23:26

## 2024-03-18 RX ADMIN — LIDOCAINE HYDROCHLORIDE 15 ML: 20 SOLUTION ORAL; TOPICAL at 23:26

## 2024-03-18 RX ADMIN — ONDANSETRON 4 MG: 2 INJECTION INTRAMUSCULAR; INTRAVENOUS at 23:26

## 2024-03-18 RX ADMIN — KETOROLAC TROMETHAMINE 15 MG: 15 INJECTION, SOLUTION INTRAMUSCULAR; INTRAVENOUS at 23:26

## 2024-03-18 ASSESSMENT — PAIN DESCRIPTION - ORIENTATION
ORIENTATION: UPPER
ORIENTATION: UPPER

## 2024-03-18 ASSESSMENT — PAIN SCALES - GENERAL
PAINLEVEL_OUTOF10: 5
PAINLEVEL_OUTOF10: 10
PAINLEVEL_OUTOF10: 9

## 2024-03-18 ASSESSMENT — LIFESTYLE VARIABLES
HOW MANY STANDARD DRINKS CONTAINING ALCOHOL DO YOU HAVE ON A TYPICAL DAY: 3 OR 4
HOW OFTEN DO YOU HAVE A DRINK CONTAINING ALCOHOL: 4 OR MORE TIMES A WEEK

## 2024-03-18 ASSESSMENT — PAIN - FUNCTIONAL ASSESSMENT: PAIN_FUNCTIONAL_ASSESSMENT: 0-10

## 2024-03-18 ASSESSMENT — PAIN DESCRIPTION - LOCATION
LOCATION: ABDOMEN
LOCATION: ABDOMEN

## 2024-03-19 ENCOUNTER — OFFICE VISIT (OUTPATIENT)
Age: 34
End: 2024-03-19
Payer: MEDICAID

## 2024-03-19 ENCOUNTER — TELEPHONE (OUTPATIENT)
Age: 34
End: 2024-03-19

## 2024-03-19 ENCOUNTER — APPOINTMENT (OUTPATIENT)
Facility: HOSPITAL | Age: 34
End: 2024-03-19
Payer: MEDICAID

## 2024-03-19 VITALS
DIASTOLIC BLOOD PRESSURE: 85 MMHG | WEIGHT: 137 LBS | BODY MASS INDEX: 25.21 KG/M2 | OXYGEN SATURATION: 96 % | HEIGHT: 62 IN | RESPIRATION RATE: 16 BRPM | TEMPERATURE: 97.9 F | SYSTOLIC BLOOD PRESSURE: 116 MMHG | HEART RATE: 86 BPM

## 2024-03-19 VITALS
RESPIRATION RATE: 18 BRPM | TEMPERATURE: 97.6 F | BODY MASS INDEX: 25.21 KG/M2 | HEART RATE: 62 BPM | SYSTOLIC BLOOD PRESSURE: 128 MMHG | OXYGEN SATURATION: 99 % | HEIGHT: 62 IN | DIASTOLIC BLOOD PRESSURE: 91 MMHG | WEIGHT: 137 LBS

## 2024-03-19 DIAGNOSIS — K81.1 CHRONIC CHOLECYSTITIS: Primary | ICD-10-CM

## 2024-03-19 LAB
EKG ATRIAL RATE: 65 BPM
EKG DIAGNOSIS: NORMAL
EKG P AXIS: 54 DEGREES
EKG P-R INTERVAL: 138 MS
EKG Q-T INTERVAL: 380 MS
EKG QRS DURATION: 86 MS
EKG QTC CALCULATION (BAZETT): 395 MS
EKG R AXIS: 77 DEGREES
EKG T AXIS: 55 DEGREES
EKG VENTRICULAR RATE: 65 BPM
TROPONIN I SERPL HS-MCNC: <3 NG/L (ref 0–54)

## 2024-03-19 PROCEDURE — 2500000003 HC RX 250 WO HCPCS: Performed by: EMERGENCY MEDICINE

## 2024-03-19 PROCEDURE — 76705 ECHO EXAM OF ABDOMEN: CPT

## 2024-03-19 PROCEDURE — 96376 TX/PRO/DX INJ SAME DRUG ADON: CPT

## 2024-03-19 PROCEDURE — 6370000000 HC RX 637 (ALT 250 FOR IP): Performed by: EMERGENCY MEDICINE

## 2024-03-19 PROCEDURE — 96375 TX/PRO/DX INJ NEW DRUG ADDON: CPT

## 2024-03-19 PROCEDURE — 99204 OFFICE O/P NEW MOD 45 MIN: CPT | Performed by: SURGERY

## 2024-03-19 PROCEDURE — 2580000003 HC RX 258: Performed by: EMERGENCY MEDICINE

## 2024-03-19 PROCEDURE — 93010 ELECTROCARDIOGRAM REPORT: CPT | Performed by: INTERNAL MEDICINE

## 2024-03-19 PROCEDURE — 6360000002 HC RX W HCPCS: Performed by: EMERGENCY MEDICINE

## 2024-03-19 PROCEDURE — A4216 STERILE WATER/SALINE, 10 ML: HCPCS | Performed by: EMERGENCY MEDICINE

## 2024-03-19 PROCEDURE — 84484 ASSAY OF TROPONIN QUANT: CPT

## 2024-03-19 RX ORDER — ONDANSETRON 2 MG/ML
4 INJECTION INTRAMUSCULAR; INTRAVENOUS ONCE
Status: COMPLETED | OUTPATIENT
Start: 2024-03-19 | End: 2024-03-19

## 2024-03-19 RX ORDER — ONDANSETRON 4 MG/1
4 TABLET, FILM COATED ORAL EVERY 8 HOURS PRN
Qty: 10 TABLET | Refills: 0 | Status: SHIPPED | OUTPATIENT
Start: 2024-03-19

## 2024-03-19 RX ORDER — MORPHINE SULFATE 2 MG/ML
2 INJECTION, SOLUTION INTRAMUSCULAR; INTRAVENOUS
Status: COMPLETED | OUTPATIENT
Start: 2024-03-19 | End: 2024-03-19

## 2024-03-19 RX ORDER — LIDOCAINE HYDROCHLORIDE 20 MG/ML
15 SOLUTION OROPHARYNGEAL
Status: COMPLETED | OUTPATIENT
Start: 2024-03-19 | End: 2024-03-19

## 2024-03-19 RX ORDER — HYDROCODONE BITARTRATE AND ACETAMINOPHEN 5; 325 MG/1; MG/1
1 TABLET ORAL EVERY 6 HOURS PRN
Qty: 10 TABLET | Refills: 0 | Status: SHIPPED | OUTPATIENT
Start: 2024-03-19 | End: 2024-03-22

## 2024-03-19 RX ORDER — AMOXICILLIN AND CLAVULANATE POTASSIUM 875; 125 MG/1; MG/1
1 TABLET, FILM COATED ORAL 2 TIMES DAILY
Qty: 20 TABLET | Refills: 0 | Status: SHIPPED | OUTPATIENT
Start: 2024-03-19 | End: 2024-03-29

## 2024-03-19 RX ORDER — AMOXICILLIN AND CLAVULANATE POTASSIUM 875; 125 MG/1; MG/1
1 TABLET, FILM COATED ORAL
Status: COMPLETED | OUTPATIENT
Start: 2024-03-19 | End: 2024-03-19

## 2024-03-19 RX ADMIN — AMOXICILLIN AND CLAVULANATE POTASSIUM 1 TABLET: 875; 125 TABLET, FILM COATED ORAL at 06:14

## 2024-03-19 RX ADMIN — LIDOCAINE HYDROCHLORIDE 15 ML: 20 SOLUTION ORAL; TOPICAL at 03:38

## 2024-03-19 RX ADMIN — MORPHINE SULFATE 2 MG: 2 INJECTION, SOLUTION INTRAMUSCULAR; INTRAVENOUS at 04:24

## 2024-03-19 RX ADMIN — ALUMINUM HYDROXIDE AND MAGNESIUM HYDROXIDE 30 ML: 200; 200 SUSPENSION ORAL at 03:38

## 2024-03-19 RX ADMIN — FAMOTIDINE 20 MG: 10 INJECTION INTRAVENOUS at 03:38

## 2024-03-19 RX ADMIN — ONDANSETRON 4 MG: 2 INJECTION INTRAMUSCULAR; INTRAVENOUS at 04:24

## 2024-03-19 ASSESSMENT — LIFESTYLE VARIABLES
HOW OFTEN DO YOU HAVE A DRINK CONTAINING ALCOHOL: NEVER
HOW MANY STANDARD DRINKS CONTAINING ALCOHOL DO YOU HAVE ON A TYPICAL DAY: PATIENT DOES NOT DRINK

## 2024-03-19 ASSESSMENT — PAIN DESCRIPTION - ORIENTATION: ORIENTATION: UPPER

## 2024-03-19 ASSESSMENT — PAIN SCALES - GENERAL: PAINLEVEL_OUTOF10: 7

## 2024-03-19 ASSESSMENT — PAIN DESCRIPTION - LOCATION: LOCATION: ABDOMEN

## 2024-03-19 NOTE — PROGRESS NOTES
Amy Hermosillo is a 33 y.o. female (: 1990)     Chief Complaint   Patient presents with    New Patient     Biliary janusz ER follow up from 3/19/2024       Medication list and allergies have been reviewed with Amy Hermosillo and updated as of today's date.     I have gone over all Medical, Surgical and Social History with Amy Hermosillo and updated/added the information accordingly.

## 2024-03-19 NOTE — PROGRESS NOTES
General Surgery Consult      Amy Stone  Admit date: (Not on file)    MRN: [id number]     : 1990     Age: 33 y.o.        Attending Physician: Courtney Cornelius MD, FACS      Subjective:     Amy is a 33 y.o. female was referred to me by the emergency room for evaluation of symptomatic cholelithiasis.  The stated for the past month she has been having epigastric and right upper quadrant pain after eating heavy food and alcohol.  She went to the emergency room and she had an ultrasound that showed a very large stone measuring around 2 cm in the neck of the gallbladder with some gallbladder wall thickening and she was given antibiotics and pain medication and she stated that she feels much better.  She is here with her mom because she would like to schedule the surgery.  She has had no nausea and no vomiting. The patient  has not had jaundice, acholic stools or dark urine and has not had a history of pancreatitis or hepatitis.  She had 2 C-sections in the past.    Patient Active Problem List    Diagnosis Date Noted    Myofascial pain 2016    Neck pain 2016    Hypermobility syndrome 2016    Pregnancy 10/27/2015     Past Medical History:   Diagnosis Date    Asthma     no diagnosis per pt but has inhaler    Gall stones     Hypermobile joints     arms, hip, upper back    Kidney stones     Scoliosis       Past Surgical History:   Procedure Laterality Date     SECTION      OTHER SURGICAL HISTORY  1          Social History     Tobacco Use    Smoking status: Former     Current packs/day: 0.00     Types: Cigarettes     Quit date: 3/18/2019     Years since quittin.0    Smokeless tobacco: Never   Substance Use Topics    Alcohol use: No      Social History     Tobacco Use   Smoking Status Former    Current packs/day: 0.00    Types: Cigarettes    Quit date: 3/18/2019    Years since quittin.0   Smokeless Tobacco Never     History reviewed. No pertinent family history.   Current

## 2024-03-19 NOTE — PATIENT INSTRUCTIONS
If you have any questions or concerns about today's appointment, the verbal and/or written instructions you were given for follow up care, please call our office at [phone].    [facility]  [address], [address]  [place] [address] [phone] office  [phone] fax       PATIENT PRE AND POST OPERATIVE INSTRUCTIONS     [address] [address] [phone]    Before Surgery Instructions:   1) You must have someone available to drive you to and from your procedure and stay with you for the first 24 hours.  2) It is very important that you have nothing to eat or drink after midnight the night before your surgery. This includes chewing gum or sucking on hard candy.  Take only heart, blood pressure and cholesterol medications the morning of surgery with only a sip of water.  3) Please stop taking Plavix 5-7 days prior to your surgery with prescribing physician approval.  Stop taking Coumadin 5 days prior to your surgery with prescribing physician approval.  Stop taking all Aspirin or Aspirin containing products 7 days prior to your surgery. Stop taking Advil, Motrin, Aleve, and etc. 3 days prior to your surgery.  4) If you take any diabetic medications please consult with your primary care physician on how to take them on the day of your surgery  5) Please stop all Herbal products 2 weeks prior to your surgery.  6) Please arrive at the hospital 2 hours prior to your surgery, unless you have been otherwise instructed.  7) Patients having an operation on their colon will be given a separate instruction sheet on their Bowel Prep.  8) For any pre-operative work up check in at the main entrance to [facility], and then go to Patient Registration. These studies are done on a walk in basis they are open from 7:00am to 5:00pm Monday through Friday.  9) A urine drug screen will be performed on the day

## 2024-03-19 NOTE — ED PROVIDER NOTES
lidocaine viscous hcl (XYLOCAINE) 2 % solution 15 mL (15 mLs Mouth/Throat Given 3/19/24 0338)   morphine (PF) injection 2 mg (2 mg IntraVENous Given 3/19/24 0424)   ondansetron (ZOFRAN) injection 4 mg (4 mg IntraVENous Given 3/19/24 0424)            The patient tolerated their visit well.   Thepatient and / or the family were informed of the results of any tests, a time was given to answer questions.    I am the Primary Clinician of Record.    FINAL IMPRESSION      1. Biliary colic        DISPOSITION/PLAN   DISPOSITION Decision To Discharge 03/19/2024 05:45:37 AM      PATIENT REFERRED TO:  Courtney Cornelius MD  [address] [address]  [phone]    Go today  between 8:00 AM and 10:00 AM      DISCHARGE MEDICATIONS:  New Prescriptions    AMOXICILLIN-CLAVULANATE (AUGMENTIN) 875-125 MG PER TABLET    Take 1 tablet by mouth 2 times daily for 10 days    HYDROCODONE-ACETAMINOPHEN (NORCO) 5-325 MG PER TABLET    Take 1 tablet by mouth every 6 hours as needed for Pain for up to 3 days. Intended supply: 3 days. Take lowest dose possible to manage pain Max Daily Amount: 4 tablets    ONDANSETRON (ZOFRAN) 4 MG TABLET    Take 1 tablet by mouth every 8 hours as needed for Nausea       DISCONTINUED MEDICATIONS:  Discontinued Medications    ONDANSETRON (ZOFRAN) 4 MG TABLET    Take 1 tablet by mouth every 8 hours as needed              (Please note that portions of this note were completed with a voice recognition program.  Efforts were made to edit the dictations but occasionally words aremis-transcribed.)    Josse Zarate MD (electronically signed)          Josse Zarate MD  03/19/24 7751

## 2024-03-19 NOTE — PROGRESS NOTES
Amy Hermosillo is a 33 y.o. female (: 1990)    Chief Complaint   Patient presents with    New Patient     Biliary colic ER follow up from 3/19/2024       Medication list and allergies have been reviewed with Amy Hermosillo and updated as of today's date.     I have gone over all Medical, Surgical and Social History with Amy Hermosillo and updated/added the information accordingly.

## 2024-03-19 NOTE — ED NOTES
Pt medicated per MAR, allergies verified.     PIV removed tip in tack. Discharge instructions given, pt verbalized understanding. All questions answered. Pt ambulatory to Triage.

## 2024-03-19 NOTE — TELEPHONE ENCOUNTER
Left voicemail message to contact our office to schedule an appointment with Dr. Cornelius for a post ER/gallbladder evaluation.

## 2024-03-19 NOTE — DISCHARGE INSTRUCTIONS
Take the amoxicillin as directed.  Take the Norco along with Zofran as needed for pain.  Please proceed to Dr. Cornelius's office between 8 AM and 10 AM today.  Return to the emergency department for any acute concerns

## 2024-03-21 NOTE — PERIOP NOTE
Instructions for your surgery at [facility]      Today's Date:  3/21/2024      Patient's Name:  Amy Hermosillo           Surgery Date:  3/27/24              Please enter the main entrance of the hospital and check-in at the  located in the lobby. Once checked in at the , you will take the elevators to the second floor, and report to the waiting room on the left. The room will say Procedure Registration.    Do NOT eat or drink anything, including candy, gum, or ice chips after midnight prior to your surgery, unless you have specific instructions from your surgeon or anesthesia provider to do so.  Brush your teeth before coming to the hospital. You may swish with water, but do not swallow.  No smoking/Vaping/E-Cigarettes 24 hours prior to the day of surgery.  No alcohol 24 hours prior to the day of surgery.  No recreational drugs for one week prior to the day of surgery.  Bring Photo ID, Insurance information, and Co-pay if required on day of surgery.  Bring in pertinent legal documents, such as, Medical Power of , DNR, Advance Directive, etc.  Leave all valuables, including money/purse, at home.  Remove all jewelry, including ALL body piercings, nail polish, acrylic nails, and makeup (including mascara); no lotions, powders, deodorant, or perfume/cologne/after shave on the skin.  Follow instruction for Hibiclens washes and CHG wipes from surgeon's office.   Glasses and dentures may be worn to the hospital. They must be removed prior to surgery. Please bring case/container for glasses or dentures.   Contact lenses should not be worn on day of surgery.   Call your doctor's office if symptoms of a cold or illness develop within 24-48 hours prior to your surgery.  Call your doctor's office if you have any questions concerning insurance or co-pays.  15. AN ADULT (relative or friend 18 years or older) MUST DRIVE YOU HOME AFTER YOUR SURGERY.  16. Please make arrangements

## 2024-03-23 ENCOUNTER — HOSPITAL ENCOUNTER (EMERGENCY)
Facility: HOSPITAL | Age: 34
Discharge: HOME | End: 2024-03-23
Payer: MEDICAID

## 2024-03-23 ENCOUNTER — APPOINTMENT (OUTPATIENT)
Facility: HOSPITAL | Age: 34
End: 2024-03-23
Payer: MEDICAID

## 2024-03-23 VITALS
HEIGHT: 62 IN | SYSTOLIC BLOOD PRESSURE: 107 MMHG | HEART RATE: 85 BPM | TEMPERATURE: 98.3 F | WEIGHT: 137 LBS | OXYGEN SATURATION: 95 % | RESPIRATION RATE: 18 BRPM | BODY MASS INDEX: 25.21 KG/M2 | DIASTOLIC BLOOD PRESSURE: 76 MMHG

## 2024-03-23 DIAGNOSIS — S93.402A SPRAIN OF LEFT ANKLE, UNSPECIFIED LIGAMENT, INITIAL ENCOUNTER: Primary | ICD-10-CM

## 2024-03-23 PROCEDURE — 99283 EMERGENCY DEPT VISIT LOW MDM: CPT

## 2024-03-23 PROCEDURE — 73610 X-RAY EXAM OF ANKLE: CPT

## 2024-03-23 ASSESSMENT — ENCOUNTER SYMPTOMS
COUGH: 0
CONSTIPATION: 0
NAUSEA: 0
ABDOMINAL PAIN: 0
VOMITING: 0
CHEST TIGHTNESS: 0
SHORTNESS OF BREATH: 0
DIARRHEA: 0

## 2024-03-23 ASSESSMENT — PAIN DESCRIPTION - LOCATION: LOCATION: ANKLE

## 2024-03-23 ASSESSMENT — PAIN - FUNCTIONAL ASSESSMENT: PAIN_FUNCTIONAL_ASSESSMENT: 0-10

## 2024-03-23 ASSESSMENT — PAIN DESCRIPTION - ORIENTATION: ORIENTATION: LEFT

## 2024-03-23 ASSESSMENT — PAIN SCALES - GENERAL: PAINLEVEL_OUTOF10: 2

## 2024-03-23 NOTE — ED PROVIDER NOTES
EMERGENCY DEPARTMENT HISTORY AND PHYSICAL EXAM    9:38 AM      Date: 3/23/2024  Patient Name: Amy Hermosillo    History of Presenting Illness     Chief Complaint   Patient presents with    Ankle Injury         History Provided By: the patient.     Additional History (Context): Amy Hermosillo is a 33 y.o. female with a history of asthma, gallstones, kidney stones presenting to the emergency department due to left ankle pain.  Patient reports that last night she was walking when she accidentally stepped off a broken curb and heard a pop in her left ankle.  Denies any other injury.  Denies falling and hitting her head.  Reports that she took some ibuprofen, but had increasing pain this morning.  States that she was concerned about a potential fracture.  Reports that she is able to walk, but it is painful.  Denies chest pain or shortness of breath.  Denies fever or chills.    PCP: Sarah Pineda MD    No current facility-administered medications for this encounter.     Current Outpatient Medications   Medication Sig Dispense Refill    amoxicillin-clavulanate (AUGMENTIN) 875-125 MG per tablet Take 1 tablet by mouth 2 times daily for 10 days 20 tablet 0    ondansetron (ZOFRAN) 4 MG tablet Take 1 tablet by mouth every 8 hours as needed for Nausea 10 tablet 0    desvenlafaxine succinate (PRISTIQ) 50 MG TB24 extended release tablet Take 1 tablet by mouth daily (Patient not taking: Reported on 3/19/2024)      nortriptyline (PAMELOR) 10 MG capsule Take 1 capsule every day by oral route at bedtime for 90 days. (Patient not taking: Reported on 3/12/2024)      tamsulosin (FLOMAX) 0.4 MG capsule Take 1 capsule by mouth daily (Patient not taking: Reported on 3/21/2024) 7 capsule 0    ketorolac (TORADOL) 10 MG tablet Take 1 tablet by mouth every 6 hours as needed for Pain (Patient not taking: Reported on 3/12/2024) 20 tablet 0    fluconazole (DIFLUCAN) 150 MG tablet Take 1 tablet by mouth today and repeat in 3 days (Patient not  transcribed by the computer software.  Please disregard these errors.  Please excuse any errors that have escaped final proofreading.        Isabella Aguero PA-C  03/23/24 0938

## 2024-03-23 NOTE — DISCHARGE INSTRUCTIONS
Alternate Tylenol and ibuprofen as needed for pain.  Rest, ice, compress, and elevate the affected leg to decrease pain and swelling.  Follow-up with orthopedics Dr. Simpson within the next 2 days in order to be reevaluated.  Return to the ED with any new or worsening symptoms.

## 2024-03-23 NOTE — ED TRIAGE NOTES
Pt states that she rolled her left ankle stepping off a curb. States that she felt a \"pop\" last night. Happened at 9pm last night. Hurts to bear weight today

## 2024-03-25 ENCOUNTER — ANESTHESIA EVENT (OUTPATIENT)
Facility: HOSPITAL | Age: 34
End: 2024-03-25
Payer: MEDICAID

## 2024-03-27 ENCOUNTER — HOSPITAL ENCOUNTER (OUTPATIENT)
Facility: HOSPITAL | Age: 34
Setting detail: OUTPATIENT SURGERY
Discharge: HOME | End: 2024-03-27
Attending: SURGERY | Admitting: SURGERY
Payer: MEDICAID

## 2024-03-27 ENCOUNTER — ANESTHESIA (OUTPATIENT)
Facility: HOSPITAL | Age: 34
End: 2024-03-27
Payer: MEDICAID

## 2024-03-27 VITALS
DIASTOLIC BLOOD PRESSURE: 70 MMHG | HEIGHT: 62 IN | OXYGEN SATURATION: 100 % | HEART RATE: 66 BPM | WEIGHT: 138.4 LBS | BODY MASS INDEX: 25.47 KG/M2 | RESPIRATION RATE: 12 BRPM | TEMPERATURE: 97.8 F | SYSTOLIC BLOOD PRESSURE: 98 MMHG

## 2024-03-27 DIAGNOSIS — Z90.49 S/P CHOLECYSTECTOMY: Primary | ICD-10-CM

## 2024-03-27 LAB
AMPHET UR QL SCN: NEGATIVE
BARBITURATES UR QL SCN: NEGATIVE
BENZODIAZ UR QL: NEGATIVE
CANNABINOIDS UR QL SCN: POSITIVE
COCAINE UR QL SCN: NEGATIVE
HCG UR QL: NEGATIVE
HCG UR QL: NEGATIVE
Lab: ABNORMAL
METHADONE UR QL: NEGATIVE
OPIATES UR QL: POSITIVE
PCP UR QL: NEGATIVE

## 2024-03-27 PROCEDURE — 7100000001 HC PACU RECOVERY - ADDTL 15 MIN: Performed by: SURGERY

## 2024-03-27 PROCEDURE — 2580000003 HC RX 258: Performed by: NURSE ANESTHETIST, CERTIFIED REGISTERED

## 2024-03-27 PROCEDURE — S2900 ROBOTIC SURGICAL SYSTEM: HCPCS | Performed by: SURGERY

## 2024-03-27 PROCEDURE — 7100000010 HC PHASE II RECOVERY - FIRST 15 MIN: Performed by: SURGERY

## 2024-03-27 PROCEDURE — 81025 URINE PREGNANCY TEST: CPT

## 2024-03-27 PROCEDURE — 2709999900 HC NON-CHARGEABLE SUPPLY: Performed by: SURGERY

## 2024-03-27 PROCEDURE — 7100000000 HC PACU RECOVERY - FIRST 15 MIN: Performed by: SURGERY

## 2024-03-27 PROCEDURE — 3600000009 HC SURGERY ROBOT BASE: Performed by: SURGERY

## 2024-03-27 PROCEDURE — 2500000003 HC RX 250 WO HCPCS: Performed by: NURSE ANESTHETIST, CERTIFIED REGISTERED

## 2024-03-27 PROCEDURE — 3700000000 HC ANESTHESIA ATTENDED CARE: Performed by: SURGERY

## 2024-03-27 PROCEDURE — 2500000003 HC RX 250 WO HCPCS: Performed by: SURGERY

## 2024-03-27 PROCEDURE — 3700000001 HC ADD 15 MINUTES (ANESTHESIA): Performed by: SURGERY

## 2024-03-27 PROCEDURE — 88304 TISSUE EXAM BY PATHOLOGIST: CPT

## 2024-03-27 PROCEDURE — 2580000003 HC RX 258: Performed by: SURGERY

## 2024-03-27 PROCEDURE — 3600000019 HC SURGERY ROBOT ADDTL 15MIN: Performed by: SURGERY

## 2024-03-27 PROCEDURE — 6360000002 HC RX W HCPCS: Performed by: ANESTHESIOLOGY

## 2024-03-27 PROCEDURE — A4217 STERILE WATER/SALINE, 500 ML: HCPCS | Performed by: SURGERY

## 2024-03-27 PROCEDURE — 47562 LAPAROSCOPIC CHOLECYSTECTOMY: CPT | Performed by: SURGERY

## 2024-03-27 PROCEDURE — 6360000002 HC RX W HCPCS: Performed by: NURSE ANESTHETIST, CERTIFIED REGISTERED

## 2024-03-27 PROCEDURE — 6360000002 HC RX W HCPCS: Performed by: SURGERY

## 2024-03-27 PROCEDURE — 6370000000 HC RX 637 (ALT 250 FOR IP): Performed by: ANESTHESIOLOGY

## 2024-03-27 PROCEDURE — 80307 DRUG TEST PRSMV CHEM ANLYZR: CPT

## 2024-03-27 PROCEDURE — 7100000011 HC PHASE II RECOVERY - ADDTL 15 MIN: Performed by: SURGERY

## 2024-03-27 PROCEDURE — A4216 STERILE WATER/SALINE, 10 ML: HCPCS | Performed by: NURSE ANESTHETIST, CERTIFIED REGISTERED

## 2024-03-27 PROCEDURE — C1889 IMPLANT/INSERT DEVICE, NOC: HCPCS | Performed by: SURGERY

## 2024-03-27 DEVICE — CLIP INT M L POLYMER LOK LIG HEM O LOK: Type: IMPLANTABLE DEVICE | Status: FUNCTIONAL

## 2024-03-27 RX ORDER — FENTANYL CITRATE 50 UG/ML
INJECTION, SOLUTION INTRAMUSCULAR; INTRAVENOUS PRN
Status: DISCONTINUED | OUTPATIENT
Start: 2024-03-27 | End: 2024-03-27 | Stop reason: SDUPTHER

## 2024-03-27 RX ORDER — SODIUM CHLORIDE, SODIUM LACTATE, POTASSIUM CHLORIDE, CALCIUM CHLORIDE 600; 310; 30; 20 MG/100ML; MG/100ML; MG/100ML; MG/100ML
INJECTION, SOLUTION INTRAVENOUS CONTINUOUS
Status: DISCONTINUED | OUTPATIENT
Start: 2024-03-27 | End: 2024-03-27 | Stop reason: HOSPADM

## 2024-03-27 RX ORDER — SODIUM CHLORIDE 0.9 % (FLUSH) 0.9 %
5-40 SYRINGE (ML) INJECTION EVERY 12 HOURS SCHEDULED
Status: DISCONTINUED | OUTPATIENT
Start: 2024-03-27 | End: 2024-03-27 | Stop reason: HOSPADM

## 2024-03-27 RX ORDER — ONDANSETRON 2 MG/ML
INJECTION INTRAMUSCULAR; INTRAVENOUS PRN
Status: DISCONTINUED | OUTPATIENT
Start: 2024-03-27 | End: 2024-03-27 | Stop reason: SDUPTHER

## 2024-03-27 RX ORDER — MAGNESIUM SULFATE 1 G/100ML
INJECTION INTRAVENOUS PRN
Status: DISCONTINUED | OUTPATIENT
Start: 2024-03-27 | End: 2024-03-27 | Stop reason: SDUPTHER

## 2024-03-27 RX ORDER — LIDOCAINE HYDROCHLORIDE 10 MG/ML
1 INJECTION, SOLUTION EPIDURAL; INFILTRATION; INTRACAUDAL; PERINEURAL
Status: DISCONTINUED | OUTPATIENT
Start: 2024-03-27 | End: 2024-03-27 | Stop reason: HOSPADM

## 2024-03-27 RX ORDER — KETOROLAC TROMETHAMINE 15 MG/ML
INJECTION, SOLUTION INTRAMUSCULAR; INTRAVENOUS PRN
Status: DISCONTINUED | OUTPATIENT
Start: 2024-03-27 | End: 2024-03-27 | Stop reason: SDUPTHER

## 2024-03-27 RX ORDER — MIDAZOLAM HYDROCHLORIDE 1 MG/ML
INJECTION INTRAMUSCULAR; INTRAVENOUS PRN
Status: DISCONTINUED | OUTPATIENT
Start: 2024-03-27 | End: 2024-03-27 | Stop reason: SDUPTHER

## 2024-03-27 RX ORDER — GLYCOPYRROLATE 0.2 MG/ML
INJECTION INTRAMUSCULAR; INTRAVENOUS PRN
Status: DISCONTINUED | OUTPATIENT
Start: 2024-03-27 | End: 2024-03-27 | Stop reason: SDUPTHER

## 2024-03-27 RX ORDER — OXYCODONE HYDROCHLORIDE AND ACETAMINOPHEN 5; 325 MG/1; MG/1
1 TABLET ORAL
Status: COMPLETED | OUTPATIENT
Start: 2024-03-27 | End: 2024-03-27

## 2024-03-27 RX ORDER — OXYCODONE HYDROCHLORIDE AND ACETAMINOPHEN 5; 325 MG/1; MG/1
1 TABLET ORAL EVERY 6 HOURS PRN
Qty: 12 TABLET | Refills: 0 | Status: SHIPPED | OUTPATIENT
Start: 2024-03-27 | End: 2024-03-30

## 2024-03-27 RX ORDER — ROCURONIUM BROMIDE 10 MG/ML
INJECTION, SOLUTION INTRAVENOUS PRN
Status: DISCONTINUED | OUTPATIENT
Start: 2024-03-27 | End: 2024-03-27 | Stop reason: SDUPTHER

## 2024-03-27 RX ORDER — SUCCINYLCHOLINE/SOD CL,ISO/PF 100 MG/5ML
SYRINGE (ML) INTRAVENOUS PRN
Status: DISCONTINUED | OUTPATIENT
Start: 2024-03-27 | End: 2024-03-27 | Stop reason: SDUPTHER

## 2024-03-27 RX ORDER — PROPOFOL 10 MG/ML
INJECTION, EMULSION INTRAVENOUS PRN
Status: DISCONTINUED | OUTPATIENT
Start: 2024-03-27 | End: 2024-03-27 | Stop reason: SDUPTHER

## 2024-03-27 RX ORDER — SODIUM CHLORIDE 0.9 % (FLUSH) 0.9 %
5-40 SYRINGE (ML) INJECTION PRN
Status: DISCONTINUED | OUTPATIENT
Start: 2024-03-27 | End: 2024-03-27 | Stop reason: HOSPADM

## 2024-03-27 RX ORDER — SODIUM CHLORIDE 9 MG/ML
INJECTION, SOLUTION INTRAVENOUS PRN
Status: DISCONTINUED | OUTPATIENT
Start: 2024-03-27 | End: 2024-03-27 | Stop reason: HOSPADM

## 2024-03-27 RX ORDER — LIDOCAINE HYDROCHLORIDE 20 MG/ML
INJECTION, SOLUTION EPIDURAL; INFILTRATION; INTRACAUDAL; PERINEURAL PRN
Status: DISCONTINUED | OUTPATIENT
Start: 2024-03-27 | End: 2024-03-27 | Stop reason: SDUPTHER

## 2024-03-27 RX ORDER — PROCHLORPERAZINE EDISYLATE 5 MG/ML
5 INJECTION INTRAMUSCULAR; INTRAVENOUS ONCE
Status: COMPLETED | OUTPATIENT
Start: 2024-03-27 | End: 2024-03-27

## 2024-03-27 RX ORDER — INDOCYANINE GREEN AND WATER 25 MG
2.5 KIT INJECTION ONCE
Status: COMPLETED | OUTPATIENT
Start: 2024-03-27 | End: 2024-03-27

## 2024-03-27 RX ORDER — NALOXONE HYDROCHLORIDE 0.4 MG/ML
INJECTION, SOLUTION INTRAMUSCULAR; INTRAVENOUS; SUBCUTANEOUS PRN
Status: DISCONTINUED | OUTPATIENT
Start: 2024-03-27 | End: 2024-03-27 | Stop reason: HOSPADM

## 2024-03-27 RX ORDER — NEOSTIGMINE METHYLSULFATE 1 MG/ML
INJECTION, SOLUTION INTRAVENOUS PRN
Status: DISCONTINUED | OUTPATIENT
Start: 2024-03-27 | End: 2024-03-27 | Stop reason: SDUPTHER

## 2024-03-27 RX ORDER — ONDANSETRON 2 MG/ML
4 INJECTION INTRAMUSCULAR; INTRAVENOUS
Status: COMPLETED | OUTPATIENT
Start: 2024-03-27 | End: 2024-03-27

## 2024-03-27 RX ORDER — DEXAMETHASONE SODIUM PHOSPHATE 4 MG/ML
INJECTION, SOLUTION INTRA-ARTICULAR; INTRALESIONAL; INTRAMUSCULAR; INTRAVENOUS; SOFT TISSUE PRN
Status: DISCONTINUED | OUTPATIENT
Start: 2024-03-27 | End: 2024-03-27 | Stop reason: SDUPTHER

## 2024-03-27 RX ADMIN — HYDROMORPHONE HYDROCHLORIDE 0.25 MG: 1 INJECTION, SOLUTION INTRAMUSCULAR; INTRAVENOUS; SUBCUTANEOUS at 14:03

## 2024-03-27 RX ADMIN — KETOROLAC TROMETHAMINE 15 MG: 15 INJECTION, SOLUTION INTRAMUSCULAR; INTRAVENOUS at 12:49

## 2024-03-27 RX ADMIN — FENTANYL CITRATE 50 MCG: 50 INJECTION INTRAMUSCULAR; INTRAVENOUS at 12:33

## 2024-03-27 RX ADMIN — NEOSTIGMINE METHYLSULFATE 3 MG: 1 INJECTION, SOLUTION INTRAVENOUS at 12:49

## 2024-03-27 RX ADMIN — Medication 100 MG: at 12:27

## 2024-03-27 RX ADMIN — INDOCYANINE GREEN AND WATER 2.5 MG: KIT at 11:40

## 2024-03-27 RX ADMIN — GLYCOPYRROLATE 0.4 MG: 0.2 INJECTION INTRAMUSCULAR; INTRAVENOUS at 12:49

## 2024-03-27 RX ADMIN — FAMOTIDINE 20 MG: 10 INJECTION, SOLUTION INTRAVENOUS at 10:51

## 2024-03-27 RX ADMIN — LIDOCAINE HYDROCHLORIDE 60 MG: 20 INJECTION, SOLUTION EPIDURAL; INFILTRATION; INTRACAUDAL; PERINEURAL at 12:27

## 2024-03-27 RX ADMIN — DEXMEDETOMIDINE HYDROCHLORIDE 4 MCG: 100 INJECTION, SOLUTION INTRAVENOUS at 12:27

## 2024-03-27 RX ADMIN — DEXAMETHASONE SODIUM PHOSPHATE 4 MG: 4 INJECTION INTRA-ARTICULAR; INTRALESIONAL; INTRAMUSCULAR; INTRAVENOUS; SOFT TISSUE at 12:27

## 2024-03-27 RX ADMIN — MIDAZOLAM 2 MG: 1 INJECTION, SOLUTION INTRAMUSCULAR; INTRAVENOUS at 12:22

## 2024-03-27 RX ADMIN — WATER 2000 MG: 1 INJECTION, SOLUTION INTRAMUSCULAR; INTRAVENOUS; SUBCUTANEOUS at 12:32

## 2024-03-27 RX ADMIN — PROPOFOL 150 MG: 10 INJECTION, EMULSION INTRAVENOUS at 12:27

## 2024-03-27 RX ADMIN — PROCHLORPERAZINE EDISYLATE 5 MG: 5 INJECTION INTRAMUSCULAR; INTRAVENOUS at 15:42

## 2024-03-27 RX ADMIN — MAGNESIUM SULFATE HEPTAHYDRATE 1000 MG: 1 INJECTION, SOLUTION INTRAVENOUS at 12:27

## 2024-03-27 RX ADMIN — SODIUM CHLORIDE, POTASSIUM CHLORIDE, SODIUM LACTATE AND CALCIUM CHLORIDE: 600; 310; 30; 20 INJECTION, SOLUTION INTRAVENOUS at 10:53

## 2024-03-27 RX ADMIN — ONDANSETRON 4 MG: 2 INJECTION INTRAMUSCULAR; INTRAVENOUS at 14:48

## 2024-03-27 RX ADMIN — HYDROMORPHONE HYDROCHLORIDE 0.25 MG: 1 INJECTION, SOLUTION INTRAMUSCULAR; INTRAVENOUS; SUBCUTANEOUS at 14:08

## 2024-03-27 RX ADMIN — ROCURONIUM BROMIDE 10 MG: 10 INJECTION, SOLUTION INTRAVENOUS at 12:33

## 2024-03-27 RX ADMIN — OXYCODONE HYDROCHLORIDE AND ACETAMINOPHEN 1 TABLET: 5; 325 TABLET ORAL at 14:43

## 2024-03-27 RX ADMIN — ONDANSETRON 4 MG: 2 INJECTION INTRAMUSCULAR; INTRAVENOUS at 12:27

## 2024-03-27 ASSESSMENT — PAIN DESCRIPTION - DESCRIPTORS
DESCRIPTORS: PRESSURE;CRAMPING
DESCRIPTORS: SHARP
DESCRIPTORS: CRAMPING

## 2024-03-27 ASSESSMENT — PAIN - FUNCTIONAL ASSESSMENT: PAIN_FUNCTIONAL_ASSESSMENT: 0-10

## 2024-03-27 ASSESSMENT — PAIN DESCRIPTION - LOCATION
LOCATION: ABDOMEN;INCISION
LOCATION: ABDOMEN;INCISION
LOCATION: ABDOMEN

## 2024-03-27 ASSESSMENT — PAIN DESCRIPTION - ORIENTATION
ORIENTATION: RIGHT
ORIENTATION: RIGHT

## 2024-03-27 ASSESSMENT — PAIN SCALES - GENERAL
PAINLEVEL_OUTOF10: 5
PAINLEVEL_OUTOF10: 6
PAINLEVEL_OUTOF10: 6
PAINLEVEL_OUTOF10: 3
PAINLEVEL_OUTOF10: 3

## 2024-03-27 NOTE — ANESTHESIA PRE PROCEDURE
Department of Anesthesiology  Preprocedure Note       Name:  Amy Stone   Age:  34 y.o.  :  1990                                          MRN:  [id number]         Date:  3/27/2024      Surgeon: Surgeon(s):  Courtney Cornelius MD    Procedure: Procedure(s):  ROBOTIC CHOLECYSTECTOMY    Medications prior to admission:   Prior to Admission medications    Medication Sig Start Date End Date Taking? Authorizing Provider   oxyCODONE-acetaminophen (PERCOCET) 5-325 MG per tablet Take 1 tablet by mouth every 6 hours as needed for Pain for up to 3 days. Intended supply: 3 days. Take lowest dose possible to manage pain Max Daily Amount: 4 tablets 3/27/24 3/30/24 Yes Courtney Cornelius MD   amoxicillin-clavulanate (AUGMENTIN) 875-125 MG per tablet Take 1 tablet by mouth 2 times daily for 10 days 3/19/24 3/29/24  Josse Zarate MD   ondansetron (ZOFRAN) 4 MG tablet Take 1 tablet by mouth every 8 hours as needed for Nausea 3/19/24   Josse Zarate MD   desvenlafaxine succinate (PRISTIQ) 50 MG TB24 extended release tablet Take 1 tablet by mouth daily  Patient not taking: Reported on 3/19/2024    Tequila Ohara MD   nortriptyline (PAMELOR) 10 MG capsule Take 1 capsule every day by oral route at bedtime for 90 days.  Patient not taking: Reported on 3/12/2024    Tequila Ohara MD   tamsulosin (FLOMAX) 0.4 MG capsule Take 1 capsule by mouth daily  Patient not taking: Reported on 3/21/2024 3/10/24   Dipika Solis APRN - NP   ketorolac (TORADOL) 10 MG tablet Take 1 tablet by mouth every 6 hours as needed for Pain  Patient not taking: Reported on 3/12/2024 3/10/24 3/15/24  Dipika Solis APRN - NP   fluconazole (DIFLUCAN) 150 MG tablet Take 1 tablet by mouth today and repeat in 3 days  Patient not taking: Reported on 3/12/2024 3/10/24   Dipika Solis APRN - NP   diclofenac (VOLTAREN) 50 MG EC tablet Take 1 tablet by mouth with breakfast and with evening meal As needed pain.  Patient not taking: 
normal

## 2024-03-27 NOTE — DISCHARGE INSTRUCTIONS
Discharge Instructions Following Surgery    Patient: Amy Hermosillo MRN: [id number]  SSN: [id number]    YOB: 1990  Age: 34 y.o.  Sex: female      Activity  As tolerated, walking encourage, stairs are okay.  Avoid strenuous activities - no lifting anything heavier than 15 pounds till seen in the clinic.  You may shower at home after 24 hours.  For the first 24 hours: do not Drive, Drink alcoholic beverages, or make important decisions.  Be aware of dizziness following anesthesia and while taking pain medication.    Diet and Medications  Regular diet after nausea from the anesthetic has passed.  Take pain medication as directed by your doctor.  Call your doctor if pain is NOT relieved by medication.    Wound and Dressing Care  There is glue on the wounds. No need for any dressing care.   Apply ice packs to the area of the surgery for the first 1 to 2 days  Apply warm compresses after 2 days for pain relieve if needed    Call your doctor if  Excessive bleeding that does not stop after holding pressure over the area.  Temperature of 101 degrees F or above.  Redness,excessive swelling or bruising, and/or green or yellow, smelly discharge from incision.  If nausea and vomiting continues.    Follow-Up    Call the office of Dr. Courtney Cornelius at [phone] to make your follow-up appointment.    Dr. Cornelius's cell phone number is [phone]. Please call me if you have any concerns or questions.

## 2024-03-27 NOTE — ANESTHESIA POSTPROCEDURE EVALUATION
Department of Anesthesiology  Postprocedure Note    Patient: Amy Stone  MRN: [id number]  YOB: 1990  Date of evaluation: 3/27/2024    Procedure Summary       Date: 03/27/24 Room / Location: [facility] MAIN 05 / [facility] MAIN OR    Anesthesia Start: 1222 Anesthesia Stop: 1314    Procedure: ROBOTIC CHOLECYSTECTOMY (Abdomen) Diagnosis:       Chronic cholecystitis      (Chronic cholecystitis [K81.1])    Surgeons: Courtney Cornelius MD Responsible Provider: Vaughn Cortez DO    Anesthesia Type: General ASA Status: 2            Anesthesia Type: General    Vera Phase I: Vera Score: 10    Vera Phase II: Vera Score: 10    Anesthesia Post Evaluation    Patient location during evaluation: PACU  Patient participation: complete - patient participated  Level of consciousness: awake and alert  Pain score: 3  Airway patency: patent  Nausea & Vomiting: no nausea and no vomiting  Cardiovascular status: hemodynamically stable  Respiratory status: acceptable  Hydration status: euvolemic  Multimodal analgesia pain management approach  Pain management: adequate    No notable events documented.

## 2024-03-27 NOTE — BRIEF OP NOTE
Brief Postoperative Note      Patient: Amy Hermosillo  YOB: 1990  MRN: [id number]    Date of Procedure: 3/27/2024    Pre-Op Diagnosis Codes:     * Chronic cholecystitis [K81.1]    Post-Op Diagnosis: Same       Procedure(s):  ROBOTIC CHOLECYSTECTOMY    Surgeon(s):  Courtney Cornelius MD    Assistant:  Surgical Assistant: Soledad Becerril    Anesthesia: General    Estimated Blood Loss (mL): Minimal    Complications: None    Specimens:   ID Type Source Tests Collected by Time Destination   A : GALLBLADDER Tissue Gallbladder SURGICAL PATHOLOGY Courtney Cornelius MD 3/27/2024 1251        Implants:  * No implants in log *      Drains: * No LDAs found *    Findings: Cholelithiaisi.       Electronically signed by Courtney Cornelius MD on 3/27/2024 at 12:56 PM

## 2024-03-27 NOTE — PROGRESS NOTES
Update History & Physical    The patient's History and Physical was reviewed with the patient and I examined the patient. There was no change. The surgical site was confirmed by the patient and me.       Plan: The risks, benefits, expected outcome, and alternative to the recommended procedure have been discussed with the patient. Patient understands and wants to proceed with the procedure. Will proceed with robotic cholecystectomy.    Electronically signed by Courtney Cornelius MD on 3/27/2024 at 10:18 AM

## 2024-03-27 NOTE — PERIOP NOTE
Patient /Family /Designee has been informed that [facility] is not responsible for patient belongings per policy and the signed [facility] Patient Agreement document.  Personal items should be sent home or checked in with security.  Patient /Family /Designee selected the following action:                            [x]  Send personal items home with a family member or friend                                                 []  Check in personal items with security, excluding clothing                            []  Maintain personal items at the bedside, against recommendation                                 by [facility]                                   ** If patient /family /designee chooses to maintain personal items at the bedside,                                      Complete the patient belongings inventory in the EMR.   Belongings are with mother, Nishi Martel.  Contact number: [phone]

## 2024-03-28 NOTE — OP NOTE
cystic duct with 3 clips on the staying side and 1 clip towards the specimen and it was divided with the scissor and then we clipped the cystic artery proximally and cauterized it distally and the gallbladder was taken out from the liver bed using electrocautery.  Hemostasis was secured.  An EndoCatch was inserted and the gallbladder was placed inside the EndoCatch.  I did scrub back in and we took the gallbladder through 8 mm trocar, and we closed with #0 Vicryl suture.  Exploration revealed good hemostasis and good closure with no bowel entrapment and abdomen was desufflated and the skin incisions were closed with 4-0 Monocryl and glue.        TIFFANY CODNE MD      YKY/AQS  D:  03/28/2024 07:37:10  T:  03/28/2024 10:43:55  JOB #:  [id number]

## 2024-04-08 ENCOUNTER — OFFICE VISIT (OUTPATIENT)
Age: 34
End: 2024-04-08

## 2024-04-08 VITALS
SYSTOLIC BLOOD PRESSURE: 111 MMHG | TEMPERATURE: 97 F | OXYGEN SATURATION: 97 % | HEIGHT: 62 IN | DIASTOLIC BLOOD PRESSURE: 81 MMHG | BODY MASS INDEX: 24.84 KG/M2 | HEART RATE: 87 BPM | WEIGHT: 135 LBS

## 2024-04-08 DIAGNOSIS — Z09 POSTOPERATIVE FOLLOW-UP: Primary | ICD-10-CM

## 2024-04-08 PROCEDURE — 99024 POSTOP FOLLOW-UP VISIT: CPT | Performed by: SURGERY

## 2024-04-08 NOTE — PROGRESS NOTES
Amy Hermosillo is a 34 y.o. female (: 1990) presenting to address:    Chief Complaint   Patient presents with    Post-Op Check     Cholecystectomy 3/2724       Medication list and allergies have been reviewed with Amy Hermosillo and updated as of today's date.     I have gone over all Medical, Surgical and Social History with Amy Hermosillo and updated/added the information accordingly.       1. Have you been to the ER, Urgent Care or Hospitalized since your last visit? No      2. Have you followed up with your PCP or any other Physicians since your procedure/ last office visit?   No

## 2024-04-08 NOTE — PROGRESS NOTES
Patient seen and examined.  She is doing well.  Her abdomen is soft and nontender and her wounds are healing well.  Her pathology showed mild chronic cholecystitis.  Follow-up as needed.

## 2024-07-23 ENCOUNTER — HOSPITAL ENCOUNTER (EMERGENCY)
Facility: HOSPITAL | Age: 34
Discharge: HOME | End: 2024-07-23
Payer: MEDICAID

## 2024-07-23 VITALS
OXYGEN SATURATION: 96 % | TEMPERATURE: 98.8 F | DIASTOLIC BLOOD PRESSURE: 85 MMHG | HEART RATE: 81 BPM | HEIGHT: 62 IN | WEIGHT: 135 LBS | BODY MASS INDEX: 24.84 KG/M2 | RESPIRATION RATE: 18 BRPM | SYSTOLIC BLOOD PRESSURE: 122 MMHG

## 2024-07-23 DIAGNOSIS — R21 RASH AND OTHER NONSPECIFIC SKIN ERUPTION: Primary | ICD-10-CM

## 2024-07-23 DIAGNOSIS — N76.0 BV (BACTERIAL VAGINOSIS): ICD-10-CM

## 2024-07-23 DIAGNOSIS — B96.89 BV (BACTERIAL VAGINOSIS): ICD-10-CM

## 2024-07-23 LAB
APPEARANCE UR: CLEAR
BILIRUB UR QL: NEGATIVE
C TRACH RRNA SPEC QL NAA+PROBE: NEGATIVE
COLOR UR: YELLOW
GLUCOSE UR STRIP.AUTO-MCNC: NEGATIVE MG/DL
HCG UR QL: NEGATIVE
HGB UR QL STRIP: NEGATIVE
KETONES UR QL STRIP.AUTO: NEGATIVE MG/DL
LEUKOCYTE ESTERASE UR QL STRIP.AUTO: NEGATIVE
N GONORRHOEA RRNA SPEC QL NAA+PROBE: NEGATIVE
NITRITE UR QL STRIP.AUTO: NEGATIVE
PH UR STRIP: 6 (ref 5–8)
PROT UR STRIP-MCNC: NEGATIVE MG/DL
SERVICE CMNT-IMP: NORMAL
SP GR UR REFRACTOMETRY: 1.01 (ref 1–1.03)
SPECIMEN SOURCE: NORMAL
T VAGINALIS RRNA SPEC QL NAA+PROBE: NEGATIVE
UROBILINOGEN UR QL STRIP.AUTO: 0.2 EU/DL (ref 0.2–1)
WET PREP GENITAL: NORMAL

## 2024-07-23 PROCEDURE — 87661 TRICHOMONAS VAGINALIS AMPLIF: CPT

## 2024-07-23 PROCEDURE — 99283 EMERGENCY DEPT VISIT LOW MDM: CPT

## 2024-07-23 PROCEDURE — 81025 URINE PREGNANCY TEST: CPT

## 2024-07-23 PROCEDURE — 81003 URINALYSIS AUTO W/O SCOPE: CPT

## 2024-07-23 PROCEDURE — 87491 CHLMYD TRACH DNA AMP PROBE: CPT

## 2024-07-23 PROCEDURE — 87591 N.GONORRHOEAE DNA AMP PROB: CPT

## 2024-07-23 PROCEDURE — 87210 SMEAR WET MOUNT SALINE/INK: CPT

## 2024-07-23 RX ORDER — SULFAMETHOXAZOLE AND TRIMETHOPRIM 800; 160 MG/1; MG/1
2 TABLET ORAL 2 TIMES DAILY
Qty: 40 TABLET | Refills: 0 | Status: SHIPPED | OUTPATIENT
Start: 2024-07-23 | End: 2024-08-02

## 2024-07-23 RX ORDER — FLUCONAZOLE 150 MG/1
150 TABLET ORAL ONCE
Qty: 1 TABLET | Refills: 0 | Status: SHIPPED | OUTPATIENT
Start: 2024-07-23 | End: 2024-07-23

## 2024-07-23 RX ORDER — CEPHALEXIN 500 MG/1
1000 CAPSULE ORAL 2 TIMES DAILY
Qty: 40 CAPSULE | Refills: 0 | Status: SHIPPED | OUTPATIENT
Start: 2024-07-23 | End: 2024-08-02

## 2024-07-23 RX ORDER — METRONIDAZOLE 500 MG/1
500 TABLET ORAL 2 TIMES DAILY
Qty: 14 TABLET | Refills: 0 | Status: SHIPPED | OUTPATIENT
Start: 2024-07-23 | End: 2024-07-30

## 2024-07-23 RX ORDER — PREDNISONE 10 MG/1
TABLET ORAL
Qty: 21 EACH | Refills: 1 | Status: SHIPPED | OUTPATIENT
Start: 2024-07-23

## 2024-07-23 RX ORDER — DIPHENHYDRAMINE HCL 25 MG
40 TABLET ORAL EVERY 8 HOURS PRN
Qty: 30 TABLET | Refills: 1 | Status: SHIPPED | OUTPATIENT
Start: 2024-07-23 | End: 2024-08-22

## 2024-07-23 ASSESSMENT — ENCOUNTER SYMPTOMS
EYES NEGATIVE: 1
GASTROINTESTINAL NEGATIVE: 1
RESPIRATORY NEGATIVE: 1

## 2024-07-23 NOTE — ED PROVIDER NOTES
kg (135 lb)       Physical Exam  Constitutional:       General: She is not in acute distress.     Appearance: Normal appearance. She is normal weight. She is not toxic-appearing.   HENT:      Head: Normocephalic.      Nose: Nose normal.      Mouth/Throat:      Mouth: Mucous membranes are moist.   Eyes:      Extraocular Movements: Extraocular movements intact.   Cardiovascular:      Rate and Rhythm: Normal rate and regular rhythm.      Pulses: Normal pulses.      Heart sounds: Normal heart sounds.   Pulmonary:      Effort: Pulmonary effort is normal.      Breath sounds: Normal breath sounds.   Abdominal:      General: Abdomen is flat.      Tenderness: There is no abdominal tenderness.   Musculoskeletal:         General: No deformity.      Cervical back: Normal range of motion and neck supple. No rigidity.   Skin:     General: Skin is warm.      Findings: Rash present.      Comments: Discrete, approx 16 individual oval maculopapular erythematous lesions to left hip, buttocks, abd, scalp, and right arm.  No excoriations, weeping drainage.   Neurological:      Mental Status: She is alert and oriented to person, place, and time.   Psychiatric:         Mood and Affect: Mood normal.         Behavior: Behavior normal.         Thought Content: Thought content normal.         Judgment: Judgment normal.         DIAGNOSTIC RESULTS     LABS:  Labs Reviewed   WET PREP, GENITAL   CHLAMYDIA, GONORRHEA, TRICHOMONIASIS   URINALYSIS   PREGNANCY, URINE       All other labs were within normal range or not returned as of this dictation.    EMERGENCY DEPARTMENT COURSE and DIFFERENTIAL DIAGNOSIS/MDM:   Vitals:    Vitals:    07/23/24 1212   BP: 122/85   Pulse: 81   Resp: 18   Temp: 98.8 °F (37.1 °C)   TempSrc: Oral   SpO2: 96%   Weight: 61.2 kg (135 lb)   Height: 1.575 m (5' 2\")       Cover for MRSA w/sudden eruption of worsening spider bites from hotel stay.  Treat BV.  Diflucan sent for PRN yeast infection.      Medical Decision

## 2024-07-23 NOTE — ED TRIAGE NOTES
Patient presented to the Emergency Dept with a complaint of generalized rashes to body since Sunday, requesting STD checks, patient denies having any symptoms     Patient alert and oriented x 4, patient breathes freely on room air in nil cardiopulmonary distress

## 2024-07-23 NOTE — ED NOTES
Pt ambulatory at discharge. NAD noted. VSS. Pt provided with discharge paperwork and verbalized understanding

## 2024-09-22 SDOH — HEALTH STABILITY: PHYSICAL HEALTH: ON AVERAGE, HOW MANY DAYS PER WEEK DO YOU ENGAGE IN MODERATE TO STRENUOUS EXERCISE (LIKE A BRISK WALK)?: 5 DAYS

## 2024-09-22 SDOH — HEALTH STABILITY: PHYSICAL HEALTH: ON AVERAGE, HOW MANY MINUTES DO YOU ENGAGE IN EXERCISE AT THIS LEVEL?: 60 MIN

## 2024-09-25 ENCOUNTER — OFFICE VISIT (OUTPATIENT)
Facility: CLINIC | Age: 34
End: 2024-09-25

## 2024-09-25 VITALS
HEIGHT: 62 IN | DIASTOLIC BLOOD PRESSURE: 75 MMHG | BODY MASS INDEX: 23.34 KG/M2 | HEART RATE: 78 BPM | SYSTOLIC BLOOD PRESSURE: 106 MMHG | WEIGHT: 126.8 LBS | OXYGEN SATURATION: 95 %

## 2024-09-25 DIAGNOSIS — Z13.29 SCREENING FOR ENDOCRINE DISORDER: ICD-10-CM

## 2024-09-25 DIAGNOSIS — Z13.6 SCREENING FOR CARDIOVASCULAR CONDITION: ICD-10-CM

## 2024-09-25 DIAGNOSIS — Z87.442 HISTORY OF KIDNEY STONES: ICD-10-CM

## 2024-09-25 DIAGNOSIS — Z00.00 ENCOUNTER FOR MEDICAL EXAMINATION TO ESTABLISH CARE: ICD-10-CM

## 2024-09-25 DIAGNOSIS — F41.9 ANXIETY AND DEPRESSION: Primary | ICD-10-CM

## 2024-09-25 DIAGNOSIS — Z11.3 SCREENING FOR STDS (SEXUALLY TRANSMITTED DISEASES): ICD-10-CM

## 2024-09-25 DIAGNOSIS — Z11.59 NEED FOR HEPATITIS B SCREENING TEST: ICD-10-CM

## 2024-09-25 DIAGNOSIS — F32.A ANXIETY AND DEPRESSION: Primary | ICD-10-CM

## 2024-09-25 SDOH — ECONOMIC STABILITY: INCOME INSECURITY: HOW HARD IS IT FOR YOU TO PAY FOR THE VERY BASICS LIKE FOOD, HOUSING, MEDICAL CARE, AND HEATING?: NOT HARD AT ALL

## 2024-09-25 SDOH — ECONOMIC STABILITY: FOOD INSECURITY: WITHIN THE PAST 12 MONTHS, THE FOOD YOU BOUGHT JUST DIDN'T LAST AND YOU DIDN'T HAVE MONEY TO GET MORE.: NEVER TRUE

## 2024-09-25 SDOH — ECONOMIC STABILITY: FOOD INSECURITY: WITHIN THE PAST 12 MONTHS, YOU WORRIED THAT YOUR FOOD WOULD RUN OUT BEFORE YOU GOT MONEY TO BUY MORE.: NEVER TRUE

## 2024-09-25 ASSESSMENT — ENCOUNTER SYMPTOMS
CHEST TIGHTNESS: 0
DIARRHEA: 0
BACK PAIN: 0
WHEEZING: 0
BLOOD IN STOOL: 0
COUGH: 0
RHINORRHEA: 0
VOMITING: 0
ABDOMINAL PAIN: 0
NAUSEA: 0
SHORTNESS OF BREATH: 0

## 2024-09-25 ASSESSMENT — PATIENT HEALTH QUESTIONNAIRE - PHQ9
SUM OF ALL RESPONSES TO PHQ QUESTIONS 1-9: 0
2. FEELING DOWN, DEPRESSED OR HOPELESS: NOT AT ALL
1. LITTLE INTEREST OR PLEASURE IN DOING THINGS: NOT AT ALL
SUM OF ALL RESPONSES TO PHQ QUESTIONS 1-9: 0
SUM OF ALL RESPONSES TO PHQ9 QUESTIONS 1 & 2: 0

## 2024-09-25 ASSESSMENT — ANXIETY QUESTIONNAIRES
7. FEELING AFRAID AS IF SOMETHING AWFUL MIGHT HAPPEN: NOT AT ALL
IF YOU CHECKED OFF ANY PROBLEMS ON THIS QUESTIONNAIRE, HOW DIFFICULT HAVE THESE PROBLEMS MADE IT FOR YOU TO DO YOUR WORK, TAKE CARE OF THINGS AT HOME, OR GET ALONG WITH OTHER PEOPLE: NOT DIFFICULT AT ALL
4. TROUBLE RELAXING: NEARLY EVERY DAY
6. BECOMING EASILY ANNOYED OR IRRITABLE: NOT AT ALL
GAD7 TOTAL SCORE: 7
3. WORRYING TOO MUCH ABOUT DIFFERENT THINGS: SEVERAL DAYS
2. NOT BEING ABLE TO STOP OR CONTROL WORRYING: SEVERAL DAYS
1. FEELING NERVOUS, ANXIOUS, OR ON EDGE: NOT AT ALL
5. BEING SO RESTLESS THAT IT IS HARD TO SIT STILL: MORE THAN HALF THE DAYS

## (undated) DEVICE — LIQUIBAND RAPID ADHESIVE 36/CS 0.8ML: Brand: MEDLINE

## (undated) DEVICE — SYRINGE MED 10ML LUERLOCK TIP W/O SFTY DISP

## (undated) DEVICE — Z DISC USE 2220241 SUTURE SZ 0 27IN 5/8 CIR UR-6  TAPER PT VIOLET ABSRB VICRYL J603H

## (undated) DEVICE — STERILE POLYISOPRENE POWDER-FREE SURGICAL GLOVES: Brand: PROTEXIS

## (undated) DEVICE — SUTURE MCRYL SZ 4-0 L27IN ABSRB UD L24MM PS-1 3/8 CIR PRIM Y935H

## (undated) DEVICE — DRAPE TOWEL: Brand: CONVERTORS

## (undated) DEVICE — APPLICATOR MEDICATED 26 CC SOLUTION HI LT ORNG CHLORAPREP

## (undated) DEVICE — ARM DRAPE

## (undated) DEVICE — TROCAR: Brand: KII SHIELDED BLADED ACCESS SYSTEM

## (undated) DEVICE — ELECTRODE PT RET AD L9FT HI MOIST COND ADH HYDRGEL CORDED

## (undated) DEVICE — SEAL

## (undated) DEVICE — TIP COVER ACCESSORY

## (undated) DEVICE — TISSUE RETRIEVAL SYSTEM: Brand: INZII RETRIEVAL SYSTEM

## (undated) DEVICE — SYRINGE MED 30ML STD CLR PLAS LUERLOCK TIP N CTRL DISP

## (undated) DEVICE — Device

## (undated) DEVICE — BLADELESS OBTURATOR: Brand: WECK VISTA

## (undated) DEVICE — NEEDLE SYR 18GA L1.5IN RED PLAS HUB S STL BLNT FILL W/O

## (undated) DEVICE — COLUMN DRAPE

## (undated) DEVICE — SOLUTION IRRIG 1000ML 0.9% SOD CHL USP POUR PLAS BTL